# Patient Record
Sex: FEMALE | Race: BLACK OR AFRICAN AMERICAN | URBAN - METROPOLITAN AREA
[De-identification: names, ages, dates, MRNs, and addresses within clinical notes are randomized per-mention and may not be internally consistent; named-entity substitution may affect disease eponyms.]

---

## 2019-12-12 ENCOUNTER — EMERGENCY (EMERGENCY)
Facility: HOSPITAL | Age: 34
LOS: 1 days | Discharge: HOME | End: 2019-12-12
Attending: EMERGENCY MEDICINE | Admitting: EMERGENCY MEDICINE
Payer: COMMERCIAL

## 2019-12-12 VITALS
OXYGEN SATURATION: 99 % | HEART RATE: 82 BPM | DIASTOLIC BLOOD PRESSURE: 80 MMHG | SYSTOLIC BLOOD PRESSURE: 135 MMHG | RESPIRATION RATE: 15 BRPM

## 2019-12-12 VITALS
RESPIRATION RATE: 14 BRPM | SYSTOLIC BLOOD PRESSURE: 147 MMHG | HEART RATE: 78 BPM | HEIGHT: 69 IN | DIASTOLIC BLOOD PRESSURE: 96 MMHG | TEMPERATURE: 98 F | WEIGHT: 276.02 LBS | OXYGEN SATURATION: 100 %

## 2019-12-12 LAB
ALBUMIN SERPL ELPH-MCNC: 3.4 G/DL — SIGNIFICANT CHANGE UP (ref 3.3–5)
ALP SERPL-CCNC: 86 U/L — SIGNIFICANT CHANGE UP (ref 40–120)
ALT FLD-CCNC: 13 U/L — SIGNIFICANT CHANGE UP (ref 12–78)
ANION GAP SERPL CALC-SCNC: 7 MMOL/L — SIGNIFICANT CHANGE UP (ref 5–17)
AST SERPL-CCNC: 10 U/L — LOW (ref 15–37)
BILIRUB SERPL-MCNC: 0.2 MG/DL — SIGNIFICANT CHANGE UP (ref 0.2–1.2)
BUN SERPL-MCNC: 7 MG/DL — SIGNIFICANT CHANGE UP (ref 7–23)
CALCIUM SERPL-MCNC: 8.8 MG/DL — SIGNIFICANT CHANGE UP (ref 8.5–10.1)
CHLORIDE SERPL-SCNC: 105 MMOL/L — SIGNIFICANT CHANGE UP (ref 96–108)
CK SERPL-CCNC: 79 U/L — SIGNIFICANT CHANGE UP (ref 26–192)
CO2 SERPL-SCNC: 27 MMOL/L — SIGNIFICANT CHANGE UP (ref 22–31)
CREAT SERPL-MCNC: 0.58 MG/DL — SIGNIFICANT CHANGE UP (ref 0.5–1.3)
D DIMER BLD IA.RAPID-MCNC: <150 NG/ML DDU — SIGNIFICANT CHANGE UP
GLUCOSE SERPL-MCNC: 129 MG/DL — HIGH (ref 70–99)
HCT VFR BLD CALC: 31.6 % — LOW (ref 34.5–45)
HGB BLD-MCNC: 9.7 G/DL — LOW (ref 11.5–15.5)
MCHC RBC-ENTMCNC: 21.9 PG — LOW (ref 27–34)
MCHC RBC-ENTMCNC: 30.7 GM/DL — LOW (ref 32–36)
MCV RBC AUTO: 71.3 FL — LOW (ref 80–100)
NRBC # BLD: 0 /100 WBCS — SIGNIFICANT CHANGE UP (ref 0–0)
PLATELET # BLD AUTO: 326 K/UL — SIGNIFICANT CHANGE UP (ref 150–400)
POTASSIUM SERPL-MCNC: 3.8 MMOL/L — SIGNIFICANT CHANGE UP (ref 3.5–5.3)
POTASSIUM SERPL-SCNC: 3.8 MMOL/L — SIGNIFICANT CHANGE UP (ref 3.5–5.3)
PROT SERPL-MCNC: 8.1 G/DL — SIGNIFICANT CHANGE UP (ref 6–8.3)
RBC # BLD: 4.43 M/UL — SIGNIFICANT CHANGE UP (ref 3.8–5.2)
RBC # FLD: 16.6 % — HIGH (ref 10.3–14.5)
SODIUM SERPL-SCNC: 139 MMOL/L — SIGNIFICANT CHANGE UP (ref 135–145)
TROPONIN I SERPL-MCNC: <.015 NG/ML — SIGNIFICANT CHANGE UP (ref 0.01–0.04)
WBC # BLD: 7.57 K/UL — SIGNIFICANT CHANGE UP (ref 3.8–10.5)
WBC # FLD AUTO: 7.57 K/UL — SIGNIFICANT CHANGE UP (ref 3.8–10.5)

## 2019-12-12 PROCEDURE — 82550 ASSAY OF CK (CPK): CPT

## 2019-12-12 PROCEDURE — 99285 EMERGENCY DEPT VISIT HI MDM: CPT

## 2019-12-12 PROCEDURE — 85027 COMPLETE CBC AUTOMATED: CPT

## 2019-12-12 PROCEDURE — 71046 X-RAY EXAM CHEST 2 VIEWS: CPT | Mod: 26

## 2019-12-12 PROCEDURE — 84484 ASSAY OF TROPONIN QUANT: CPT

## 2019-12-12 PROCEDURE — 36415 COLL VENOUS BLD VENIPUNCTURE: CPT

## 2019-12-12 PROCEDURE — 93010 ELECTROCARDIOGRAM REPORT: CPT

## 2019-12-12 PROCEDURE — 71046 X-RAY EXAM CHEST 2 VIEWS: CPT

## 2019-12-12 PROCEDURE — 96360 HYDRATION IV INFUSION INIT: CPT

## 2019-12-12 PROCEDURE — 93005 ELECTROCARDIOGRAM TRACING: CPT

## 2019-12-12 PROCEDURE — 99284 EMERGENCY DEPT VISIT MOD MDM: CPT | Mod: 25

## 2019-12-12 PROCEDURE — 80053 COMPREHEN METABOLIC PANEL: CPT

## 2019-12-12 PROCEDURE — 85379 FIBRIN DEGRADATION QUANT: CPT

## 2019-12-12 PROCEDURE — 99284 EMERGENCY DEPT VISIT MOD MDM: CPT

## 2019-12-12 RX ORDER — SODIUM CHLORIDE 9 MG/ML
1000 INJECTION INTRAMUSCULAR; INTRAVENOUS; SUBCUTANEOUS ONCE
Refills: 0 | Status: COMPLETED | OUTPATIENT
Start: 2019-12-12 | End: 2019-12-12

## 2019-12-12 RX ADMIN — SODIUM CHLORIDE 1000 MILLILITER(S): 9 INJECTION INTRAMUSCULAR; INTRAVENOUS; SUBCUTANEOUS at 15:01

## 2019-12-12 RX ADMIN — SODIUM CHLORIDE 1000 MILLILITER(S): 9 INJECTION INTRAMUSCULAR; INTRAVENOUS; SUBCUTANEOUS at 14:01

## 2019-12-12 NOTE — ED PROVIDER NOTE - NSFOLLOWUPINSTRUCTIONS_ED_ALL_ED_FT
1)  Follow-up with your Primary Medical Doctor, Dr ogden. Call today / next business day for prompt follow-up.  2) Follow-up with Cardiology as discussed. Call today / next business day for prompt follow-up and further workup and evaluation.  3) Return to Emergency room for any worsening or persistent pain, shortness of breath, weakness, fever, abdominal pain, dizziness, passing out, unexplained pain in arms, legs, back, any vomiting, feeling like your heart is racing,  or any other concerning symptoms.  4) See attached instruction sheets for additional information, including information regarding signs and symptoms to look out for, reasons to seek immediate care and other important instructions.

## 2019-12-12 NOTE — CONSULT NOTE ADULT - SUBJECTIVE AND OBJECTIVE BOX
[facility]         Angeles Ramirez, Flori, Renée, Jase, Ronald, Tamiko        [phone] (office)    CHIEF COMPLAINT: Patient is a 33y old  Female who presents with a chief complaint of chest pain    HPI: 32yo F with PMH of DM type 2 not on insulin, HTN, Anemia, Labrum tear s/p surgical repair, herniated cervical discs presents to the ED c/o chest pain. Pt states she woke up this morning with severe neck pain 8/10 in intensity after which she started experiencing chest pain. CP 8/10, sharp radiating to back and neck. Pt also states she has numbness/tingling down the left arm which she has had in the past related to her neck.  Admits to dizziness and headache associated with this episode as well. Symptoms started at 9-10am and patient tried to lie down and ate breakfast however symptoms did not resolve which prompted her to come to ED.  Of note, since this past saturday pt has had elevated BPs noted by her /115.     PAST MEDICAL & SURGICAL HISTORY:  DM (diabetes mellitus)  HTN (hypertension)      SOCIAL HISTORY: No active tobacco, alcohol or illicit drug use    FAMILY HISTORY:  Mother Liver cirrhosis  Father Lung Ca    Outpatient medications:  Metformin  Lisinopril  Tramadol  Ferrous sulfate            MEDICATIONS  (STANDING):    MEDICATIONS  (PRN):      Allergies    No Known Allergies    Intolerances        REVIEW OF SYSTEMS: Is negative for eye, ENT, GI, , allergic, dermatologic, musculoskeletal and neurologic, except as described above.    VITAL SIGNS:   Vital Signs Last 24 Hrs  T(C): 36.9 (12 Dec 2019 13:04), Max: 36.9 (12 Dec 2019 13:04)  T(F): 98.5 (12 Dec 2019 13:04), Max: 98.5 (12 Dec 2019 13:04)  HR: 78 (12 Dec 2019 13:04) (78 - 78)  BP: 147/96 (12 Dec 2019 13:04) (147/96 - 147/96)  BP(mean): --  RR: 14 (12 Dec 2019 13:04) (14 - 14)  SpO2: 100% (12 Dec 2019 13:04) (100% - 100%)    I&O's Summary      PHYSICAL EXAM:    Constitutional: NAD, awake and alert, well-developed  Eyes:  EOMI, no oral cyanosis, conjunctivae clear, anicteric.  Pulmonary: Non-labored, breath sounds are clear bilaterally, no wheezing, rales or rhonchi  Cardiovascular:  regular S1 and S2. No murmur.  No rubs, gallops or clicks  Gastrointestinal: Bowel Sounds present, soft, nontender.   MSK: Reproducible L sided chest tenderness  Lymph: No peripheral edema.   Neurological: Alert, strength and sensitivity are grossly intact  Skin: No obvious lesions/rashes.   Psych:  Mood & affect appropriate .    LABS: All Labs Reviewed:                        9.7    7.57  )-----------( 326      ( 12 Dec 2019 13:54 )             31.6     12 Dec 2019 13:54    139    |  105    |  7      ----------------------------<  129    3.8     |  27     |  0.58     Ca    8.8        12 Dec 2019 13:54    TPro  8.1    /  Alb  3.4    /  TBili  0.2    /  DBili  x      /  AST  10     /  ALT  13     /  AlkPhos  86     12 Dec 2019 13:54      CARDIAC MARKERS ( 12 Dec 2019 13:54 )  <.015 ng/mL / x     / x     / x     / x          Blood Culture:         RADIOLOGY:  < from: Xray Chest 2 Views PA/Lat (12.12.19 @ 13:54) >  EXAM:  XR CHEST PA LAT 2V                            PROCEDURE DATE:  12/12/2019          INTERPRETATION:  CLINICAL STATEMENT: Chest pain.    TECHNIQUE: PA and lateral views of the chest.    COMPARISON: None available.    FINDINGS/  IMPRESSION:  Noconsolidation or infiltrate.  No pleural effusion.    Heart size within normal limits.                  CAROLYNN SNOW M.D., ATTENDING RADIOLOGIST  This document has been electronically signed. Dec 12 2019  2:01PM                < end of copied text >      EKG:  NSR    Impression/Plan:

## 2019-12-12 NOTE — CONSULT NOTE ADULT - ASSESSMENT
34yo F with PMH of DM type 2 not on insulin, HTN, Obesity, Anemia, Labrum tear s/p surgical repair, herniated cervical discs presents to the ED c/o chest pain    - Doubt ACS at this time given neg trop, symptoms likely MSK   - No clear evidence of acute ischemia, trops negative x 1, pt asymptomatic at this time  - BP is elevated and can contribute to some of her symptoms, she needs stricter BP control  - should have full cardiac work up outpatient including ECHO, stress test  - would have her f/u outpatient  - No evidence of volume overload at this time  - monitor and replete lytes, keep K>4, Mg>2 34yo F with PMH of DM type 2 not on insulin, HTN, Obesity, Anemia, Labrum tear s/p surgical repair, herniated cervical discs presents to the ED c/o chest pain    - Doubt ACS at this time given neg trop, with symptoms likely MSK and clear reproducibility of her sxs on palpation  - No clear evidence of acute ischemia, trops negative x 1, pt asymptomatic at this time  - BP is elevated and can contribute to some of her symptoms, she needs stricter BP control. Given her pain, this may not be the right time to assess for whether her bp control is adequate.  will defer for now  - should have full cardiac work up outpatient including ECHO, stress test, though we can discuss this in the office in time  - would have her f/u outpatient  - No evidence of volume overload at this time  - monitor and replete lytes, keep K>4, Mg>2

## 2019-12-12 NOTE — ED PROVIDER NOTE - PROGRESS NOTE DETAILS
pt seen by tammie Lowery to [facility], for outpt fu  At length discussion with patient regarding nature of the chest pain. Discussed results of all diagnostic testing in ED. Discussed chest pain precautions and instructions. Patient demonstrates understanding that a cardiac cause of the chest pain has not been totally excluded, but at this time there is no evidence to warrant an inpatient workup.  Discussed the importance of continued follow-up with Cardiology as outpatient to complete cardiac workup.

## 2019-12-12 NOTE — ED ADULT NURSE NOTE - OBJECTIVE STATEMENT
Patient stable No acute distress noted. Cardiac results - MD found pain to be muscular dc'd. MSpencer

## 2019-12-12 NOTE — ED PROVIDER NOTE - OBJECTIVE STATEMENT
32 yo F with newly diagnosed "borderline DM" and HTN pw episode of chest heaviness today at rest. Lasted few min and then resoled. pt then had 2nd episode, which lasted ~ 2 hrs so came to ed. pt reportedly had short duration similar episode last week. no other cp/sob/palp. no fever/chills. no cough/congestion. No recent travel / immob. no agg/allev factors. pt states saw PMD this past weekend and was very hypertensive - was treated with IM medication in office. No other acute co. Pt with some dizziness today as well, earlier today.

## 2019-12-12 NOTE — ED PROVIDER NOTE - PATIENT PORTAL LINK FT
You can access the FollowMyHealth Patient Portal offered by [facility] by registering at the following website: [email or web address]. By joining [employer]’s FollowMyHealth portal, you will also be able to view your health information using other applications (apps) compatible with our system.

## 2019-12-12 NOTE — ED PROVIDER NOTE - CARE PROVIDER_API CALL
Ankit Chinchilla)  Cardiovascular Disease  [address] [place] [address]  Phone: [phone]  Fax: [phone]  Follow Up Time:

## 2020-01-09 ENCOUNTER — APPOINTMENT (OUTPATIENT)
Dept: CARDIOLOGY | Facility: CLINIC | Age: 35
End: 2020-01-09
Payer: COMMERCIAL

## 2020-01-09 ENCOUNTER — NON-APPOINTMENT (OUTPATIENT)
Age: 35
End: 2020-01-09

## 2020-01-09 VITALS
OXYGEN SATURATION: 97 % | HEART RATE: 74 BPM | DIASTOLIC BLOOD PRESSURE: 91 MMHG | SYSTOLIC BLOOD PRESSURE: 141 MMHG | WEIGHT: 270 LBS

## 2020-01-09 VITALS — BODY MASS INDEX: 39.87 KG/M2 | HEIGHT: 69 IN

## 2020-01-09 DIAGNOSIS — Z86.79 PERSONAL HISTORY OF OTHER DISEASES OF THE CIRCULATORY SYSTEM: ICD-10-CM

## 2020-01-09 PROBLEM — I10 ESSENTIAL (PRIMARY) HYPERTENSION: Chronic | Status: ACTIVE | Noted: 2019-12-12

## 2020-01-09 PROCEDURE — 93000 ELECTROCARDIOGRAM COMPLETE: CPT

## 2020-01-09 PROCEDURE — 99215 OFFICE O/P EST HI 40 MIN: CPT

## 2020-01-09 NOTE — HISTORY OF PRESENT ILLNESS
[FreeTextEntry1] : Yenni is a 34 year old female here for evaluation.\par She recently went to the emergency room with an episode of chest pain. \par Workup, including blood work and EKG were unremarkable. Her blood pressure was mildly elevated.\par \par She has a significant history of orthopedic issues/herniated discs, after a fall in 2018. She has been walking with a cane since. She also has a history of diabetes and hypertension. She is currently on lisinopril and metformin.\par \par She has no family history of coronary artery disease. She denies toxic habits. Two days prior to her chest pain, she was started on Ozempic. She reported a mid sternal chest pain, associated with left arm numbness, that did not resolve. It was also associated with some dizziness. In the past, her elevated blood pressures have been associated with dizziness.\par \par She has not had any chest pain since. She does report occasional dyspnea on rest. Her exercise tolerance is limited by her orthopedic issues.\par She takes tramadol and gabapentin for her back pain, with ibuprofen.

## 2020-01-09 NOTE — DISCUSSION/SUMMARY
[With Me] : with me [FreeTextEntry1] : Yenni has a history of hypertension, and had an episode of chest pain last week. The pain is atypical in description, though was associated with some left arm symptoms. It seems that her exercise tolerance is more limited by her orthopedic issues, then by her respiratory and cardiac status.\par \par Her EKG today demonstrates a sinus rhythm without obvious ischemia or chamber enlargement. Her blood pressure is elevated, though did she did not take her lisinopril this morning. Her physical exam is unremarkable.\par \par She will start checking her blood pressure at home, and we will see the trend. For now, she will continue lisinopril 10 mg daily. She will have an echocardiogram to evaluate for structural heart disease. If her symptoms return, we will set her up for a pharmacologic stress test. I stressed the importance of diet, exercise, and weight loss, to reduce her overall cardiovascular risk. I will call her with results of the echocardiogram, and arrange followup.

## 2020-01-09 NOTE — PHYSICAL EXAM
[General Appearance - Well Developed] : well developed [Well Groomed] : well groomed [Normal Appearance] : normal appearance [General Appearance - Well Nourished] : well nourished [No Deformities] : no deformities [Normal Conjunctiva] : the conjunctiva exhibited no abnormalities [General Appearance - In No Acute Distress] : no acute distress [Eyelids - No Xanthelasma] : the eyelids demonstrated no xanthelasmas [Normal Oral Mucosa] : normal oral mucosa [No Oral Pallor] : no oral pallor [No Jugular Venous Colon A Waves] : no jugular venous colon A waves [No Oral Cyanosis] : no oral cyanosis [Normal Jugular Venous V Waves Present] : normal jugular venous V waves present [Normal Jugular Venous A Waves Present] : normal jugular venous A waves present [Respiration, Rhythm And Depth] : normal respiratory rhythm and effort [Auscultation Breath Sounds / Voice Sounds] : lungs were clear to auscultation bilaterally [Exaggerated Use Of Accessory Muscles For Inspiration] : no accessory muscle use [Normal S1] : normal S1 [Normal S2] : normal S2 [Normal Rate] : normal [S4] : no S4 [S3] : no S3 [No Murmur] : no murmurs heard [Right Carotid Bruit] : no bruit heard over the right carotid [Left Carotid Bruit] : no bruit heard over the left carotid [Left Femoral Bruit] : no bruit heard over the left femoral artery [Right Femoral Bruit] : no bruit heard over the right femoral artery [2+] : right 2+ [No Pitting Edema] : no pitting edema present [No Abnormalities] : the abdominal aorta was not enlarged and no bruit was heard [Abdomen Tenderness] : non-tender [Abdomen Soft] : soft [Abdomen Mass (___ Cm)] : no abdominal mass palpated [FreeTextEntry1] : with cane [Nail Clubbing] : no clubbing of the fingernails [Cyanosis, Localized] : no localized cyanosis [Petechial Hemorrhages (___cm)] : no petechial hemorrhages [Skin Color & Pigmentation] : normal skin color and pigmentation [] : no rash [Skin Lesions] : no skin lesions [No Venous Stasis] : no venous stasis [No Skin Ulcers] : no skin ulcer [No Xanthoma] : no  xanthoma was observed [Mood] : the mood was normal [Oriented To Time, Place, And Person] : oriented to person, place, and time [Affect] : the affect was normal [No Anxiety] : not feeling anxious

## 2020-01-09 NOTE — REVIEW OF SYSTEMS
[Feeling Fatigued] : feeling fatigued [Shortness Of Breath] : shortness of breath [Chest Pain] : chest pain [Joint Pain] : joint pain [Joint Stiffness] : joint stiffness [Limb Weakness (Paresis)] : limb weakness [Negative] : Endocrine

## 2020-01-13 ENCOUNTER — APPOINTMENT (OUTPATIENT)
Dept: CARDIOLOGY | Facility: CLINIC | Age: 35
End: 2020-01-13
Payer: COMMERCIAL

## 2020-01-13 PROCEDURE — 93306 TTE W/DOPPLER COMPLETE: CPT

## 2020-03-05 ENCOUNTER — APPOINTMENT (OUTPATIENT)
Dept: CARDIOLOGY | Facility: CLINIC | Age: 35
End: 2020-03-05

## 2020-07-29 ENCOUNTER — NON-APPOINTMENT (OUTPATIENT)
Age: 35
End: 2020-07-29

## 2020-07-29 ENCOUNTER — APPOINTMENT (OUTPATIENT)
Dept: CARDIOLOGY | Facility: CLINIC | Age: 35
End: 2020-07-29
Payer: COMMERCIAL

## 2020-07-29 VITALS
DIASTOLIC BLOOD PRESSURE: 90 MMHG | HEART RATE: 69 BPM | OXYGEN SATURATION: 99 % | BODY MASS INDEX: 39.25 KG/M2 | SYSTOLIC BLOOD PRESSURE: 142 MMHG | HEIGHT: 69 IN | WEIGHT: 265 LBS

## 2020-07-29 PROCEDURE — 93000 ELECTROCARDIOGRAM COMPLETE: CPT

## 2020-07-29 PROCEDURE — 99214 OFFICE O/P EST MOD 30 MIN: CPT

## 2020-07-29 NOTE — PHYSICAL EXAM
[General Appearance - Well Developed] : well developed [Normal Appearance] : normal appearance [Well Groomed] : well groomed [General Appearance - Well Nourished] : well nourished [No Deformities] : no deformities [General Appearance - In No Acute Distress] : no acute distress [Normal Conjunctiva] : the conjunctiva exhibited no abnormalities [Eyelids - No Xanthelasma] : the eyelids demonstrated no xanthelasmas [No Oral Pallor] : no oral pallor [Normal Oral Mucosa] : normal oral mucosa [No Oral Cyanosis] : no oral cyanosis [Normal Jugular Venous V Waves Present] : normal jugular venous V waves present [Normal Jugular Venous A Waves Present] : normal jugular venous A waves present [No Jugular Venous Colon A Waves] : no jugular venous colon A waves [Respiration, Rhythm And Depth] : normal respiratory rhythm and effort [Exaggerated Use Of Accessory Muscles For Inspiration] : no accessory muscle use [Auscultation Breath Sounds / Voice Sounds] : lungs were clear to auscultation bilaterally [Abdomen Soft] : soft [Abdomen Tenderness] : non-tender [FreeTextEntry1] : with cane [Nail Clubbing] : no clubbing of the fingernails [Abdomen Mass (___ Cm)] : no abdominal mass palpated [Cyanosis, Localized] : no localized cyanosis [Petechial Hemorrhages (___cm)] : no petechial hemorrhages [] : no rash [Skin Color & Pigmentation] : normal skin color and pigmentation [No Venous Stasis] : no venous stasis [Skin Lesions] : no skin lesions [No Xanthoma] : no  xanthoma was observed [No Skin Ulcers] : no skin ulcer [Oriented To Time, Place, And Person] : oriented to person, place, and time [Affect] : the affect was normal [Mood] : the mood was normal [Normal Rate] : normal [No Anxiety] : not feeling anxious [Normal S1] : normal S1 [Normal S2] : normal S2 [S4] : no S4 [S3] : no S3 [No Murmur] : no murmurs heard [Right Carotid Bruit] : no bruit heard over the right carotid [Left Carotid Bruit] : no bruit heard over the left carotid [Right Femoral Bruit] : no bruit heard over the right femoral artery [Left Femoral Bruit] : no bruit heard over the left femoral artery [2+] : left 2+ [No Abnormalities] : the abdominal aorta was not enlarged and no bruit was heard [No Pitting Edema] : no pitting edema present

## 2020-07-29 NOTE — DISCUSSION/SUMMARY
[With Me] : with me [___ Month(s)] : [unfilled] month(s) [FreeTextEntry1] : Yenni has a history of hypertension, and the numbers have been running a little higher as of late.  She has pain overall, and has been taking NSAIDs, though has been substituting them with tylenol. It seems that her exercise tolerance is more limited by her orthopedic issues, then by her respiratory and cardiac status.\par \par Her EKG today demonstrates a sinus rhythm without obvious ischemia or chamber enlargement. Her blood pressure is elevated, though did she did not take her lisinopril this morning. Her physical exam is unremarkable.\par \par For her BP, I have increased her lisinopril to 20 mg daily. Her recent potassium and creatinine from last month were normal. I stressed the importance of diet, exercise, and weight loss, to reduce her overall cardiovascular risk. We reviewed low impact exercises like yoga, calisthenics and swimming. I will see her again in 3 months time.

## 2020-07-29 NOTE — HISTORY OF PRESENT ILLNESS
[FreeTextEntry1] : Yenni is a 34 year old female here for follow up.\par I last saw her in 1/2020. Prior to this, she went to the emergency room with an episode of chest pain. \par Workup, including blood work and EKG were unremarkable. Her blood pressure was mildly elevated.\par \par She has a significant history of orthopedic issues/herniated discs, after a fall in 2018. She has been walking with a cane since. She also has a history of diabetes and hypertension. She is currently on lisinopril and metformin. She has no family history of coronary artery disease. She denies toxic habits. \par \par She is feeling well. She has no chest pain. Her exercise tolerance is limited by her orthopedic issues. Her Bp has been running higher at home. She reports very mild edema at the end of the day.\par Echocardiogram with normal LV function, mild concentric LV remodeling.\par She takes tramadol and gabapentin for her back pain, with ibuprofen.

## 2020-07-29 NOTE — REVIEW OF SYSTEMS
[Feeling Fatigued] : feeling fatigued [Joint Stiffness] : joint stiffness [Limb Weakness (Paresis)] : limb weakness [Joint Pain] : joint pain [Negative] : Endocrine

## 2020-10-27 ENCOUNTER — APPOINTMENT (OUTPATIENT)
Dept: CARDIOLOGY | Facility: CLINIC | Age: 35
End: 2020-10-27

## 2021-01-02 ENCOUNTER — EMERGENCY (EMERGENCY)
Facility: HOSPITAL | Age: 36
LOS: 1 days | Discharge: HOME | End: 2021-01-02
Attending: STUDENT IN AN ORGANIZED HEALTH CARE EDUCATION/TRAINING PROGRAM | Admitting: STUDENT IN AN ORGANIZED HEALTH CARE EDUCATION/TRAINING PROGRAM
Payer: COMMERCIAL

## 2021-01-02 VITALS
DIASTOLIC BLOOD PRESSURE: 95 MMHG | HEIGHT: 69 IN | HEART RATE: 85 BPM | RESPIRATION RATE: 18 BRPM | WEIGHT: 293 LBS | TEMPERATURE: 98 F | SYSTOLIC BLOOD PRESSURE: 150 MMHG | OXYGEN SATURATION: 100 %

## 2021-01-02 PROCEDURE — 96374 THER/PROPH/DIAG INJ IV PUSH: CPT

## 2021-01-02 PROCEDURE — 99283 EMERGENCY DEPT VISIT LOW MDM: CPT

## 2021-01-02 PROCEDURE — 72100 X-RAY EXAM L-S SPINE 2/3 VWS: CPT | Mod: 26

## 2021-01-02 PROCEDURE — 99284 EMERGENCY DEPT VISIT MOD MDM: CPT | Mod: 25

## 2021-01-02 PROCEDURE — 72100 X-RAY EXAM L-S SPINE 2/3 VWS: CPT

## 2021-01-02 RX ORDER — DEXAMETHASONE 0.5 MG/5ML
10 ELIXIR ORAL ONCE
Refills: 0 | Status: DISCONTINUED | OUTPATIENT
Start: 2021-01-02 | End: 2021-01-02

## 2021-01-02 RX ORDER — DEXAMETHASONE 0.5 MG/5ML
10 ELIXIR ORAL ONCE
Refills: 0 | Status: COMPLETED | OUTPATIENT
Start: 2021-01-02 | End: 2021-01-02

## 2021-01-02 RX ORDER — LIDOCAINE 4 G/100G
1 CREAM TOPICAL ONCE
Refills: 0 | Status: COMPLETED | OUTPATIENT
Start: 2021-01-02 | End: 2021-01-02

## 2021-01-02 RX ADMIN — LIDOCAINE 1 PATCH: 4 CREAM TOPICAL at 14:00

## 2021-01-02 RX ADMIN — Medication 102 MILLIGRAM(S): at 14:00

## 2021-01-02 NOTE — ED PROVIDER NOTE - PATIENT PORTAL LINK FT
You can access the FollowMyHealth Patient Portal offered by [facility] by registering at the following website: [email or web address]. By joining [employer]’s FollowMyHealth portal, you will also be able to view your health information using other applications (apps) compatible with our system.

## 2021-01-02 NOTE — ED ADULT NURSE NOTE - NSIMPLEMENTINTERV_GEN_ALL_ED
Implemented All Fall Risk Interventions:  [place] to call system. Call bell, personal items and telephone within reach. Instruct patient to call for assistance. Room bathroom lighting operational. Non-slip footwear when patient is off stretcher. Physically safe environment: no spills, clutter or unnecessary equipment. Stretcher in lowest position, wheels locked, appropriate side rails in place. Provide visual cue, wrist band, yellow gown, etc. Monitor gait and stability. Monitor for mental status changes and reorient to person, place, and time. Review medications for side effects contributing to fall risk. Reinforce activity limits and safety measures with patient and family.

## 2021-01-02 NOTE — ED PROVIDER NOTE - OBJECTIVE STATEMENT
35 year old female with history of chronic back pain (with chronic weakness and numbness to left leg), DM, and HTN BIBA for worsening back pain with worsening 35 year old female with history of chronic back pain (with chronic weakness and numbness to left leg), DM, and HTN BIBA for worsening back pain with worsening weakness to left lower leg. also reports intermittent incontinence of urine, not stool. no numbness in groin. had 3rd steroid injection 5 days ago, reports no improvement. increased back discomfort since yesterday. today was walking and felt sudden increase in pain to back and "legs went numb".   reports work comp injury april 2nd 2018 from a slip and fall. had labrum tear to left hip with surgery and herniated disk of L4 and L5. has had chronic pain since the injury   PCP Christiano Vines   [facility]

## 2021-01-02 NOTE — ED PROVIDER NOTE - ATTENDING CONTRIBUTION TO CARE
I, Gm Whittaker DO, personally saw the patient with ACP.  I have personally performed a face to face diagnostic evaluation on this patient.  I have reviewed the ACP note and agree with the history, exam, and plan of care, except as noted.    35 F with chronic back pain treated with injections here complaining of worsening back pain and left leg weakness. Also with reported intermittent bladder incontinence. Pain increased suddenly when walking. On exam, patient comfortable, lungs clear to auscultation anteriorly, heart regular rate and rhythm, left leg with weakness to hip flexion / knee extension. Concern for worsening back pain including acute spinal cord pathology. will get ortho spine on board.

## 2021-01-02 NOTE — ED ADULT TRIAGE NOTE - CHIEF COMPLAINT QUOTE
Patient c.o chronic back pain and left leg numbness Patient c.o chronic back pain and left leg numbness. Patient states she had a cortisone shot on Monday with no relief. Patient states she's been becoming incontinent at times since yesterday,

## 2021-01-02 NOTE — ED PROVIDER NOTE - CONSTITUTIONAL, MLM
normal... Well appearing, awake, alert, oriented to person, place, time/situation and in no apparent distress. laughing and joking

## 2021-01-02 NOTE — ED PROVIDER NOTE - NSFOLLOWUPINSTRUCTIONS_ED_ALL_ED_FT
ice or moist heat  gentle stretching, gentle massage  medrol dose pack, start tomorrow  follow up with Dr. Pang Monday  MRI Monday at 2 pm as scheduled       Acute Low Back Pain    WHAT YOU NEED TO KNOW:    Acute low back pain is sudden discomfort in your lower back area that lasts for up to 6 weeks. The discomfort makes it difficult to tolerate activity.     DISCHARGE INSTRUCTIONS:    Return to the emergency department if:   •You have severe pain.      •You have sudden stiffness and heaviness on both buttocks down to both legs.      •You have numbness or weakness in one leg, or pain in both legs.      •You have numbness in your genital area or across your lower back.      •You cannot control your urine or bowel movements.      Contact your healthcare provider if:   •You have a fever.      •You have pain at night or when you rest.      •Your pain does not get better with treatment.      •You have pain that worsens when you cough or sneeze.      •You suddenly feel something pop or snap in your back.      •You have questions or concerns about your condition or care.      Medicines: You may need any of the following:   •NSAIDs help decrease swelling and pain. This medicine is available with or without a doctor's order. NSAIDs can cause stomach bleeding or kidney problems in certain people. If you take blood thinner medicine, always ask your healthcare provider if NSAIDs are safe for you. Always read the medicine label and follow directions.      •Acetaminophen decreases pain and fever. It is available without a doctor's order. Ask how much to take and how often to take it. Follow directions. Read the labels of all other medicines you are using to see if they also contain acetaminophen, or ask your doctor or pharmacist. Acetaminophen can cause liver damage if not taken correctly. Do not use more than 4 grams (4,000 milligrams) total of acetaminophen in one day.       •Muscle relaxers decrease pain by relaxing the muscles in your lower spine.      •Prescription pain medicine may be given. Ask your healthcare provider how to take this medicine safely. Some prescription pain medicines contain acetaminophen. Do not take other medicines that contain acetaminophen without talking to your healthcare provider. Too much acetaminophen may cause liver damage. Prescription pain medicine may cause constipation. Ask your healthcare provider how to prevent or treat constipation.       Self-care:   •Stay active as much as you can without causing more pain. Bed rest could make your back pain worse. Start with some light exercises, such as walking. Avoid heavy lifting until your pain is gone. Ask for more information about the activities or exercises that are right for you.       •Apply ice on your back for 15 to 20 minutes every hour or as directed. Use an ice pack, or put crushed ice in a plastic bag. Cover it with a towel before you apply it to your skin. Ice helps prevent tissue damage and decreases swelling and pain.       •Apply heat on your back for 20 to 30 minutes every 2 hours for as many days as directed. Heat helps decrease pain and muscle spasms. Alternate heat and ice.      Prevent acute low back pain:   •Use proper body mechanics. ?Bend at the hips and knees when you  objects. Do not bend from the waist. Use your leg muscles as you lift the load. Do not use your back. Keep the object close to your chest as you lift it. Try not to twist or lift anything above your waist.      ?Change your position often when you stand for long periods of time. Rest one foot on a small box or footrest, and then switch to the other foot often.      ?Try not to sit for long periods of time. When you do, sit in a straight-backed chair with your feet flat on the floor. Never reach, pull, or push while you are sitting.      •Do exercises that strengthen your back muscles. Warm up before you exercise. Ask your healthcare provider the best exercises for you.      •Maintain a healthy weight. Ask your healthcare provider how much you should weigh. Ask him or her to help you create a weight loss plan if you are overweight.      Follow up with your healthcare provider as directed: Return for a follow-up visit if you still have pain after 1 to 3 weeks of treatment. You may need to visit an orthopedist if your back pain lasts longer than 12 weeks. Write down your questions so you remember to ask them during your visits.

## 2021-01-02 NOTE — ED PROVIDER NOTE - CLINICAL SUMMARY MEDICAL DECISION MAKING FREE TEXT BOX
worsening low back pain (chronic back pain) with weakness to ext and on and off incontinence. consider cauda equina syndrome. +rectal tone. x-ray lumbar spine, ortho consult

## 2021-01-02 NOTE — ED ADULT NURSE NOTE - CAS DISCH TRANSFER METHOD
The diagnosis of accelerated HTN has been documented for this patient. In ICD-10, accelerated HTN is an unspecified term. Based on your medical judgement, could your documentation be further specified as:     =>Hypertensive urgency  =>Hypertensive crisis  =>Hypertensive emergency  =>Other Explanation of clinical findings  =>Unable to Determine (no explanation of clinical findings)     The medical record reflects the following clinical findings, treatment, and risk factors:     66 yo female PMH HTN, PAF presenting with SOB; noncompliant with Coreg and Diltiazem. /117. Given Hydralazine 20 mg IV x 1, Labetalol 20 mg IV x 1, Norvasc 5 mg po, and resumed home meds: Clonidine 0.1 mg TID, Diltiazem 30 mg TID, Tambocor 50 mg BID, Zestril 10 mg daily. Please clarify and document your clinical opinion in the progress notes and discharge summary including the definitive and/or presumptive diagnosis, (suspected or probable), related to the above clinical findings. Please include clinical findings supporting your diagnosis.      REFERENCE:  -----------------------------------------------  Hypertensive Urgency: SBP > 180 or DBP > 120 in the absence of progressive target organ dysfunction     Hypertensive Crisis: BP elevates rapidly and severely enough to potentially cause organ damage (e.g. severe HA, SOB, nosebleed, severe anxiety, nausea/vomiting, etc.)    Hypertensive Emergency: SBP >180 or DBP > 120 with associated organ dysfunction (e.g. CVA, LOC, memory loss, MI, CONI, aortic dissection, angina, pulmonary edema, encephalopathy, retinopathy, HELLP, etc.)    Wallace Alcala, [facility], [address] [place] [address]  [email or web address] Private car

## 2021-01-02 NOTE — ED PROVIDER NOTE - CARE PROVIDER_API CALL
Yair Pang  ORTHOPAEDIC SURGERY  [address] [facility], [address] [place] [address]  Phone: [phone]  Fax: [phone]  Follow Up Time: 1-3 Days

## 2021-01-02 NOTE — ED ADULT NURSE NOTE - CHIEF COMPLAINT QUOTE
Patient c.o chronic back pain and left leg numbness. Patient states she had a cortisone shot on Monday with no relief. Patient states she's been becoming incontinent at times since yesterday,

## 2021-01-02 NOTE — ED PROVIDER NOTE - NEUROLOGICAL, MLM
Alert and oriented, no focal deficits. decreased sensation to left leg when compared to right (chronic per patient). diffuse weakness to left leg when compared to right (chronic per patient). no saddle anesthesia. +rectal tone

## 2021-01-02 NOTE — ED PROVIDER NOTE - PROGRESS NOTE DETAILS
was seen and evaluated by ortho. does not suspect cauda equina syndrome. suspects incontinence on and off due to worsening pain. +rectal tone. has MRI scheduled outpatient in 2 days. will see Dr. Pang in 2 days. overall pain much improved after decadron. ambulatory with use of cane (baseline per patient). feels comfortable and requesting to go home. return precautions explained.   Discussed the results of all diagnostic testing in ED and copies of all reports given.   An opportunity to ask questions was given.  Discussed the importance of prompt, close medical follow-up.  Patient will return with any changes, concerns or persistent / worsening symptoms.  Understanding of all instructions verbalized.

## 2021-01-02 NOTE — ED PROVIDER NOTE - CARE PLAN
Principal Discharge DX:	Acute low back pain with sciatica, sciatica laterality unspecified, unspecified back pain laterality  Secondary Diagnosis:	Lumbar radiculopathy, acute

## 2021-01-02 NOTE — ED PROVIDER NOTE - PRINCIPAL DIAGNOSIS
Acute low back pain with sciatica, sciatica laterality unspecified, unspecified back pain laterality

## 2021-01-02 NOTE — CONSULT NOTE ADULT - SUBJECTIVE AND OBJECTIVE BOX
Patient is a 35y Female who presents c/o increasing pain in the lower back with radiation of symptoms to the left lower extremity and weakness for one weeks duration. Patient had a fall in 2018 which she has been following with a pain management and orthopedic spine surgeon for ongoing lower back pain and radicular symptoms. Patient has seen Dr Pang in the past for evaluation for the need for spinal surgery. At that time surgery was not indicated. Patient states she has been having episodes of urinary incontinence that have occurred since the increase in pain over the past week. Patient states she has full sensation when going to the bathroom and cleaning herself. Denies pain/injury elsewhere. Denies bowel incontinence. Denies fevers/chills. No other complaints at this time.      Allergies    No Known Allergies        PAST MEDICAL & SURGICAL HISTORY:  Chronic back pain    DM (diabetes mellitus)    HTN (hypertension)        Vital Signs Last 24 Hrs  T(C): 36.9 (01-02-21 @ 12:06), Max: 36.9 (01-02-21 @ 12:06)  T(F): 98.5 (01-02-21 @ 12:06), Max: 98.5 (01-02-21 @ 12:06)  HR: 85 (01-02-21 @ 12:06) (85 - 85)  BP: 150/95 (01-02-21 @ 12:06) (150/95 - 150/95)  BP(mean): --  RR: 18 (01-02-21 @ 12:06) (18 - 18)  SpO2: 100% (01-02-21 @ 12:06) (100% - 100%)    Gen: NAD    Spine PE:  Skin intact  No gross deformity  No midnline TTP C/T/L/S spine  No bony step offs  Paraspinal muscle ttp/hypertonicity in the lumbar region   Negative clonus  Negative babinski  Negative breaux  + rectal tone passive and active  No saddle anesthesia                 Bicep        Tricep      Wrist Ext     Finger Flex      Finger Abd  R            5/5          5/5           5/5              5/5                5/5	                           L             5/5           5/5          5/5              5/5                5/5                                                         Hip Flex       Quad     Ankle DF        Ankle PF       Toe Ext          R            5/5              5/5          5/5                 5/5                 5/5	                L            5/5              5/5           5/5                 5/5                 5/5                    Sensory:            C5         C6         C7      C8       T1        (0=absent, 1=impaired, 2=normal, NT=not testable)  R         2            2           2        2         2  L          2            2           2        2         2               L2          L3         L4      L5       S1         (0=absent, 1=impaired, 2=normal, NT=not testable)  R         2            2            2        2        2  L          1            2           2        2         2      Imaging: XR L Spine reveals no evidence of fracture or spondylolisthesis, disc height maintained      A/P: 35y Female with lumbar radiculopathy radiating to the left lower extremity, low concern for acute cauda equina syndrome    Recommend dose of decadron in the ED   Recommend Medrol Dose pack   Recommend Outpatient MRI of the lumbar spine  Pain control  WBAT with assistive devices as needed  -Orthopaedically stable for discharge  -Follow up w/ Dr. Pang on Monday 1/4/20 after discharge. Call office to schedule appointment.  -Discharge planning  -All patient's questions answered. Patient understands and agrees w/ above plan.  -Will discuss w/ attending and advise if plan changes.

## 2021-05-21 PROBLEM — M54.9 DORSALGIA, UNSPECIFIED: Chronic | Status: ACTIVE | Noted: 2021-01-02

## 2021-06-25 ENCOUNTER — NON-APPOINTMENT (OUTPATIENT)
Age: 36
End: 2021-06-25

## 2021-06-25 ENCOUNTER — APPOINTMENT (OUTPATIENT)
Dept: CARDIOLOGY | Facility: CLINIC | Age: 36
End: 2021-06-25
Payer: COMMERCIAL

## 2021-06-25 VITALS
OXYGEN SATURATION: 100 % | BODY MASS INDEX: 35.99 KG/M2 | DIASTOLIC BLOOD PRESSURE: 85 MMHG | WEIGHT: 243 LBS | HEIGHT: 69 IN | SYSTOLIC BLOOD PRESSURE: 122 MMHG | HEART RATE: 86 BPM

## 2021-06-25 PROCEDURE — 99072 ADDL SUPL MATRL&STAF TM PHE: CPT

## 2021-06-25 PROCEDURE — 93000 ELECTROCARDIOGRAM COMPLETE: CPT

## 2021-06-25 PROCEDURE — 99214 OFFICE O/P EST MOD 30 MIN: CPT

## 2021-06-25 NOTE — PHYSICAL EXAM
[Well Developed] : well developed [Well Nourished] : well nourished [No Acute Distress] : no acute distress [Normal Conjunctiva] : normal conjunctiva [Normal Venous Pressure] : normal venous pressure [No Carotid Bruit] : no carotid bruit [No Murmur] : no murmur [Normal S1, S2] : normal S1, S2 [No Rub] : no rub [No Gallop] : no gallop [Clear Lung Fields] : clear lung fields [Good Air Entry] : good air entry [No Respiratory Distress] : no respiratory distress  [Non Tender] : non-tender [Soft] : abdomen soft [No Masses/organomegaly] : no masses/organomegaly [Normal Bowel Sounds] : normal bowel sounds [Normal Gait] : normal gait [No Edema] : no edema [No Cyanosis] : no cyanosis [No Clubbing] : no clubbing [No Varicosities] : no varicosities [No Rash] : no rash [No Skin Lesions] : no skin lesions [Moves all extremities] : moves all extremities [No Focal Deficits] : no focal deficits [Normal Speech] : normal speech [Alert and Oriented] : alert and oriented [Normal memory] : normal memory

## 2021-06-25 NOTE — HISTORY OF PRESENT ILLNESS
[FreeTextEntry1] : Yenni is a 35 year old female here for follow up.\par I last saw her in 7/2020. Prior to this, she went to the emergency room with an episode of chest pain. \par Workup, including blood work and EKG were unremarkable. Her blood pressure was mildly elevated.\par \par She has a significant history of orthopedic issues/herniated discs, after a fall in 2018. She has been walking with a cane since. She also has a history of diabetes and hypertension. She is currently on lisinopril and metformin. She has no family history of coronary artery disease. She denies toxic habits. \par \par She is feeling well. She did have an episode of chest pain, radiating to her neck and back. Her exercise tolerance is limited by her orthopedic issues. Her Bp has been running much better. She reports very mild edema at the end of the day.\par Echocardiogram with normal LV function, mild concentric LV remodeling in 2020.\par She takes tramadol and gabapentin for her back pain, with ibuprofen.\par She did go to the ER earlier this year for hyperglycemia and leg weakness.

## 2021-06-25 NOTE — DISCUSSION/SUMMARY
[With Me] : with me [___ Month(s)] : in [unfilled] month(s) [FreeTextEntry1] : Yenni has a history of hypertension, and the numbers have been running much better.  It seems that her exercise tolerance is more limited by her orthopedic issues, then by her respiratory and cardiac status. She did have an episode of chest pain though quite atypical.\par \par Her EKG today demonstrates a sinus rhythm without obvious ischemia or chamber enlargement. Her blood pressure is well controlled.\par \par She will remain on lisinopril. We will repeat her echocardiogram, and if the pain continues, consider a pharmacologic stress test. I stressed the importance of diet, exercise, and weight loss, to reduce her overall cardiovascular risk. We reviewed low impact exercises like yoga, calisthenics and swimming. She is going to find a new PMD and have a full set of blood work, including lipid profile.  I will see her again in 3-6 months time.

## 2021-07-02 ENCOUNTER — APPOINTMENT (OUTPATIENT)
Dept: CARDIOLOGY | Facility: CLINIC | Age: 36
End: 2021-07-02
Payer: COMMERCIAL

## 2021-07-02 PROCEDURE — 99072 ADDL SUPL MATRL&STAF TM PHE: CPT

## 2021-07-02 PROCEDURE — 93306 TTE W/DOPPLER COMPLETE: CPT

## 2022-01-11 ENCOUNTER — APPOINTMENT (OUTPATIENT)
Dept: MATERNAL FETAL MEDICINE | Facility: CLINIC | Age: 37
End: 2022-01-11
Payer: COMMERCIAL

## 2022-01-11 ENCOUNTER — ASOB RESULT (OUTPATIENT)
Age: 37
End: 2022-01-11

## 2022-01-11 PROCEDURE — G0108 DIAB MANAGE TRN  PER INDIV: CPT | Mod: 95

## 2022-01-18 RX ORDER — ISOPROPYL ALCOHOL 0.7 ML/ML
SWAB TOPICAL
Qty: 2 | Refills: 1 | Status: ACTIVE | COMMUNITY
Start: 2022-01-11 | End: 1900-01-01

## 2022-01-19 ENCOUNTER — ASOB RESULT (OUTPATIENT)
Age: 37
End: 2022-01-19

## 2022-01-19 ENCOUNTER — APPOINTMENT (OUTPATIENT)
Dept: ANTEPARTUM | Facility: CLINIC | Age: 37
End: 2022-01-19
Payer: COMMERCIAL

## 2022-01-19 PROCEDURE — G0108 DIAB MANAGE TRN  PER INDIV: CPT | Mod: 95

## 2022-01-21 DIAGNOSIS — E03.9 HYPOTHYROIDISM, UNSPECIFIED: ICD-10-CM

## 2022-01-21 DIAGNOSIS — Z87.59 PERSONAL HISTORY OF OTHER COMPLICATIONS OF PREGNANCY, CHILDBIRTH AND THE PUERPERIUM: ICD-10-CM

## 2022-01-21 DIAGNOSIS — Z87.898 PERSONAL HISTORY OF OTHER SPECIFIED CONDITIONS: ICD-10-CM

## 2022-01-25 ENCOUNTER — ASOB RESULT (OUTPATIENT)
Age: 37
End: 2022-01-25

## 2022-01-25 ENCOUNTER — APPOINTMENT (OUTPATIENT)
Dept: MATERNAL FETAL MEDICINE | Facility: CLINIC | Age: 37
End: 2022-01-25
Payer: COMMERCIAL

## 2022-01-25 PROCEDURE — G0108 DIAB MANAGE TRN  PER INDIV: CPT | Mod: 95

## 2022-01-26 ENCOUNTER — NON-APPOINTMENT (OUTPATIENT)
Age: 37
End: 2022-01-26

## 2022-01-27 ENCOUNTER — NON-APPOINTMENT (OUTPATIENT)
Age: 37
End: 2022-01-27

## 2022-01-27 ENCOUNTER — APPOINTMENT (OUTPATIENT)
Dept: MATERNAL FETAL MEDICINE | Facility: CLINIC | Age: 37
End: 2022-01-27
Payer: COMMERCIAL

## 2022-01-27 ENCOUNTER — APPOINTMENT (OUTPATIENT)
Dept: OBGYN | Facility: CLINIC | Age: 37
End: 2022-01-27
Payer: COMMERCIAL

## 2022-01-27 ENCOUNTER — APPOINTMENT (OUTPATIENT)
Dept: ANTEPARTUM | Facility: CLINIC | Age: 37
End: 2022-01-27
Payer: COMMERCIAL

## 2022-01-27 ENCOUNTER — ASOB RESULT (OUTPATIENT)
Age: 37
End: 2022-01-27

## 2022-01-27 ENCOUNTER — LABORATORY RESULT (OUTPATIENT)
Age: 37
End: 2022-01-27

## 2022-01-27 VITALS
BODY MASS INDEX: 37.18 KG/M2 | DIASTOLIC BLOOD PRESSURE: 83 MMHG | SYSTOLIC BLOOD PRESSURE: 132 MMHG | HEIGHT: 69 IN | WEIGHT: 251 LBS

## 2022-01-27 DIAGNOSIS — S79.912A UNSPECIFIED INJURY OF LEFT HIP, INITIAL ENCOUNTER: ICD-10-CM

## 2022-01-27 DIAGNOSIS — Z80.1 FAMILY HISTORY OF MALIGNANT NEOPLASM OF TRACHEA, BRONCHUS AND LUNG: ICD-10-CM

## 2022-01-27 DIAGNOSIS — M51.26 OTHER INTERVERTEBRAL DISC DISPLACEMENT, LUMBAR REGION: ICD-10-CM

## 2022-01-27 DIAGNOSIS — Z87.898 PERSONAL HISTORY OF OTHER SPECIFIED CONDITIONS: ICD-10-CM

## 2022-01-27 DIAGNOSIS — R07.89 OTHER CHEST PAIN: ICD-10-CM

## 2022-01-27 LAB
ALBUMIN SERPL ELPH-MCNC: 4.2 G/DL
ALP BLD-CCNC: 71 U/L
ALT SERPL-CCNC: 10 U/L
ANION GAP SERPL CALC-SCNC: 15 MMOL/L
AST SERPL-CCNC: 11 U/L
BILIRUB SERPL-MCNC: <0.2 MG/DL
BUN SERPL-MCNC: 7 MG/DL
CALCIUM SERPL-MCNC: 9.6 MG/DL
CHLORIDE SERPL-SCNC: 100 MMOL/L
CO2 SERPL-SCNC: 20 MMOL/L
CREAT SERPL-MCNC: 0.39 MG/DL
ESTIMATED AVERAGE GLUCOSE: 243 MG/DL
FERRITIN SERPL-MCNC: 23 NG/ML
GLUCOSE BLDC GLUCOMTR-MCNC: 172
GLUCOSE SERPL-MCNC: 63 MG/DL
HBA1C MFR BLD HPLC: 10.1 %
POTASSIUM SERPL-SCNC: 3.6 MMOL/L
PROT SERPL-MCNC: 7.5 G/DL
SODIUM SERPL-SCNC: 135 MMOL/L
TSH SERPL-ACNC: 0.43 UIU/ML

## 2022-01-27 PROCEDURE — 0500F INITIAL PRENATAL CARE VISIT: CPT

## 2022-01-27 PROCEDURE — 76801 OB US < 14 WKS SINGLE FETUS: CPT

## 2022-01-27 PROCEDURE — 36416 COLLJ CAPILLARY BLOOD SPEC: CPT

## 2022-01-27 PROCEDURE — 99204 OFFICE O/P NEW MOD 45 MIN: CPT

## 2022-01-27 PROCEDURE — 76813 OB US NUCHAL MEAS 1 GEST: CPT | Mod: 59

## 2022-01-27 RX ORDER — GABAPENTIN 300 MG
300 TABLET ORAL
Refills: 0 | Status: COMPLETED | COMMUNITY
End: 2022-01-27

## 2022-01-27 RX ORDER — CHROMIUM 200 MCG
TABLET ORAL
Refills: 0 | Status: COMPLETED | COMMUNITY
End: 2022-01-27

## 2022-01-27 RX ORDER — PRENATAL NO.167/FOLIC ACID/DHA 0.4MG-25MG
0.4-25 TABLET,CHEWABLE ORAL
Refills: 0 | Status: ACTIVE | COMMUNITY

## 2022-01-27 RX ORDER — IBUPROFEN 800 MG/1
800 TABLET, FILM COATED ORAL
Refills: 0 | Status: COMPLETED | COMMUNITY
End: 2022-01-27

## 2022-01-27 RX ORDER — LISINOPRIL 10 MG/1
10 TABLET ORAL DAILY
Qty: 90 | Refills: 3 | Status: COMPLETED | COMMUNITY
End: 2022-01-27

## 2022-01-27 RX ORDER — TRAMADOL HYDROCHLORIDE 50 MG/1
50 TABLET, COATED ORAL
Refills: 0 | Status: COMPLETED | COMMUNITY
End: 2022-01-27

## 2022-01-27 RX ORDER — ASPIRIN ENTERIC COATED TABLETS 81 MG 81 MG/1
81 TABLET, DELAYED RELEASE ORAL DAILY
Qty: 45 | Refills: 9 | Status: ACTIVE | COMMUNITY
Start: 2022-01-27

## 2022-01-30 ENCOUNTER — NON-APPOINTMENT (OUTPATIENT)
Age: 37
End: 2022-01-30

## 2022-01-30 LAB
ABO + RH PNL BLD: NORMAL
BACTERIA UR CULT: NORMAL
BLD GP AB SCN SERPL QL: NORMAL
C TRACH RRNA SPEC QL NAA+PROBE: NOT DETECTED
CANDIDA VAG CYTO: NOT DETECTED
G VAGINALIS+PREV SP MTYP VAG QL MICRO: NOT DETECTED
HBV SURFACE AG SER QL: NONREACTIVE
HCV AB SER QL: NONREACTIVE
HCV S/CO RATIO: 0.13 S/CO
HIV1+2 AB SPEC QL IA.RAPID: NONREACTIVE
LEAD BLD-MCNC: <1 UG/DL
MEV IGG FLD QL IA: 48.4 AU/ML
MEV IGG+IGM SER-IMP: POSITIVE
N GONORRHOEA RRNA SPEC QL NAA+PROBE: NOT DETECTED
RUBV IGG FLD-ACNC: 2.2 INDEX
RUBV IGG SER-IMP: POSITIVE
SOURCE AMPLIFICATION: NORMAL
T VAGINALIS VAG QL WET PREP: NOT DETECTED
VZV AB TITR SER: POSITIVE
VZV IGG SER IF-ACNC: 932.9 INDEX

## 2022-02-01 ENCOUNTER — APPOINTMENT (OUTPATIENT)
Dept: CARDIOLOGY | Facility: CLINIC | Age: 37
End: 2022-02-01
Payer: COMMERCIAL

## 2022-02-01 ENCOUNTER — NON-APPOINTMENT (OUTPATIENT)
Age: 37
End: 2022-02-01

## 2022-02-01 VITALS
OXYGEN SATURATION: 99 % | DIASTOLIC BLOOD PRESSURE: 80 MMHG | BODY MASS INDEX: 37.03 KG/M2 | HEART RATE: 97 BPM | WEIGHT: 250 LBS | SYSTOLIC BLOOD PRESSURE: 123 MMHG | HEIGHT: 69 IN

## 2022-02-01 PROCEDURE — 99214 OFFICE O/P EST MOD 30 MIN: CPT

## 2022-02-01 PROCEDURE — 93000 ELECTROCARDIOGRAM COMPLETE: CPT

## 2022-02-01 NOTE — HISTORY OF PRESENT ILLNESS
[FreeTextEntry1] : Yenni is a 36 year old female here for follow up.\par \par I last saw her in 4/2021. Prior to this, she went to the emergency room with an episode of chest pain. \par Workup, including blood work and EKG were unremarkable. Her blood pressure was mildly elevated.\par \par She has a significant history of orthopedic issues/herniated discs, after a fall in 2018. She has been walking with a cane since. She also has a history of diabetes and hypertension. She is currently on lisinopril and metformin. She has no family history of coronary artery disease. She denies toxic habits. \par \par She is feeling well. Her exercise tolerance has limited by her orthopedic issues.\par She is now 13 weeks pregnant. Her lisinopril was appropriately stopped. Her BP has been running fair at home. Her sugars have been higher, and she is now on insulin 6x/day.\par She reports episodes of palpitations and mild dyspnea on exertion.\par \par Echocardiogram with normal LV function, mild concentric LV remodeling in 2020, without change in 2021.\par She is only on tylenol for the orthopedic pain.

## 2022-02-01 NOTE — REVIEW OF SYSTEMS
[Joint Stiffness] : joint stiffness [Negative] : Heme/Lymph [Dyspnea on exertion] : dyspnea during exertion [Palpitations] : palpitations

## 2022-02-01 NOTE — DISCUSSION/SUMMARY
[With Me] : with me [___ Month(s)] : in [unfilled] month(s) [FreeTextEntry1] : Yenni has a history of hypertension and is now 13 weeks pregnant. Her exercise tolerance has generally been limited by her orthopedic issues, then by her respiratory and cardiac status. Her LV function is normal on recent echocardiogram. Her EKG today demonstrates a sinus rhythm without obvious ischemia or chamber enlargement. Her blood pressure is acceptable today. \par \par Her lisinopril has been appropriately stopped. I am going to add labetalol 100 mg po bid, which will help with her BP and palpitations. She will continue to monitor her BP at home. She needs to stay hydrated and to continue to control her sugars.\par  \par I stressed the importance of diet, exercise, and weight loss, to reduce her overall cardiovascular risk. \par I will see her again in 2-3 months.

## 2022-02-03 ENCOUNTER — NON-APPOINTMENT (OUTPATIENT)
Age: 37
End: 2022-02-03

## 2022-02-04 ENCOUNTER — ASOB RESULT (OUTPATIENT)
Age: 37
End: 2022-02-04

## 2022-02-04 ENCOUNTER — APPOINTMENT (OUTPATIENT)
Dept: MATERNAL FETAL MEDICINE | Facility: CLINIC | Age: 37
End: 2022-02-04
Payer: COMMERCIAL

## 2022-02-04 PROCEDURE — G0108 DIAB MANAGE TRN  PER INDIV: CPT | Mod: 95

## 2022-02-08 ENCOUNTER — NON-APPOINTMENT (OUTPATIENT)
Age: 37
End: 2022-02-08

## 2022-02-08 LAB
AR GENE MUT ANL BLD/T: NORMAL
B19V IGG SER QL IA: 8.76 INDEX
B19V IGG+IGM SER-IMP: NORMAL
B19V IGG+IGM SER-IMP: POSITIVE
B19V IGM FLD-ACNC: 0.11 INDEX
B19V IGM SER-ACNC: NEGATIVE
CFTR MUT TESTED BLD/T: NEGATIVE
FMR1 GENE MUT ANL BLD/T: NORMAL
HGB A MFR BLD: 97.6 %
HGB A2 MFR BLD: 2.4 %
HGB FRACT BLD-IMP: NORMAL

## 2022-02-11 ENCOUNTER — APPOINTMENT (OUTPATIENT)
Dept: MATERNAL FETAL MEDICINE | Facility: CLINIC | Age: 37
End: 2022-02-11
Payer: COMMERCIAL

## 2022-02-11 ENCOUNTER — ASOB RESULT (OUTPATIENT)
Age: 37
End: 2022-02-11

## 2022-02-11 PROCEDURE — G0108 DIAB MANAGE TRN  PER INDIV: CPT | Mod: 95

## 2022-02-15 LAB
CLARI ADDITIONAL INFO: NORMAL
CLARI CHROMOSOME 13: NORMAL
CLARI CHROMOSOME 18: NORMAL
CLARI CHROMOSOME 21: NORMAL
CLARI SEX CHROMOSOMES: NORMAL
CLARITEST NIPT: NORMAL
MATERNAL WEIGHT (LBS):: NORMAL
PLEASE INCLUDE GENDER RESULTS ON THIS REPORT:: NORMAL
TYPE OF PREGNANCY:: NORMAL

## 2022-02-17 RX ORDER — LANCETS 33 GAUGE
EACH MISCELLANEOUS
Qty: 1 | Refills: 3 | Status: ACTIVE | COMMUNITY
Start: 2022-02-11 | End: 1900-01-01

## 2022-02-18 ENCOUNTER — APPOINTMENT (OUTPATIENT)
Dept: MATERNAL FETAL MEDICINE | Facility: CLINIC | Age: 37
End: 2022-02-18
Payer: COMMERCIAL

## 2022-02-18 ENCOUNTER — ASOB RESULT (OUTPATIENT)
Age: 37
End: 2022-02-18

## 2022-02-18 PROCEDURE — G0108 DIAB MANAGE TRN  PER INDIV: CPT | Mod: 95

## 2022-02-24 ENCOUNTER — ASOB RESULT (OUTPATIENT)
Age: 37
End: 2022-02-24

## 2022-02-24 ENCOUNTER — APPOINTMENT (OUTPATIENT)
Dept: ANTEPARTUM | Facility: CLINIC | Age: 37
End: 2022-02-24
Payer: COMMERCIAL

## 2022-02-24 ENCOUNTER — NON-APPOINTMENT (OUTPATIENT)
Age: 37
End: 2022-02-24

## 2022-02-24 PROCEDURE — 76805 OB US >/= 14 WKS SNGL FETUS: CPT

## 2022-02-25 ENCOUNTER — NON-APPOINTMENT (OUTPATIENT)
Age: 37
End: 2022-02-25

## 2022-02-25 ENCOUNTER — APPOINTMENT (OUTPATIENT)
Dept: OBGYN | Facility: CLINIC | Age: 37
End: 2022-02-25
Payer: COMMERCIAL

## 2022-02-25 ENCOUNTER — LABORATORY RESULT (OUTPATIENT)
Age: 37
End: 2022-02-25

## 2022-02-25 VITALS
HEIGHT: 69 IN | DIASTOLIC BLOOD PRESSURE: 80 MMHG | WEIGHT: 257 LBS | BODY MASS INDEX: 38.06 KG/M2 | SYSTOLIC BLOOD PRESSURE: 144 MMHG

## 2022-02-25 DIAGNOSIS — D64.9 ANEMIA, UNSPECIFIED: ICD-10-CM

## 2022-02-25 PROCEDURE — 81003 URINALYSIS AUTO W/O SCOPE: CPT | Mod: NC,QW

## 2022-02-25 PROCEDURE — 0502F SUBSEQUENT PRENATAL CARE: CPT

## 2022-03-01 ENCOUNTER — NON-APPOINTMENT (OUTPATIENT)
Age: 37
End: 2022-03-01

## 2022-03-01 LAB
ALBUMIN SERPL ELPH-MCNC: 3.9 G/DL
ALP BLD-CCNC: 69 U/L
ALT SERPL-CCNC: 14 U/L
ANION GAP SERPL CALC-SCNC: 14 MMOL/L
AST SERPL-CCNC: 12 U/L
BASOPHILS # BLD AUTO: 0.03 K/UL
BASOPHILS # BLD AUTO: 0.06 K/UL
BASOPHILS NFR BLD AUTO: 0.3 %
BASOPHILS NFR BLD AUTO: 0.5 %
BILIRUB SERPL-MCNC: <0.2 MG/DL
BUN SERPL-MCNC: 6 MG/DL
CALCIUM SERPL-MCNC: 9.3 MG/DL
CHLORIDE SERPL-SCNC: 102 MMOL/L
CO2 SERPL-SCNC: 19 MMOL/L
CREAT SERPL-MCNC: 0.4 MG/DL
EOSINOPHIL # BLD AUTO: 0.08 K/UL
EOSINOPHIL # BLD AUTO: 0.11 K/UL
EOSINOPHIL NFR BLD AUTO: 0.7 %
EOSINOPHIL NFR BLD AUTO: 1.3 %
FIBRINOGEN PPP COAG.DERIVED-MCNC: 866 MG/DL
GLUCOSE SERPL-MCNC: 126 MG/DL
HCT VFR BLD CALC: 31.2 %
HCT VFR BLD CALC: 34.3 %
HGB BLD-MCNC: 10.4 G/DL
HGB BLD-MCNC: 9.7 G/DL
IMM GRANULOCYTES NFR BLD AUTO: 0.4 %
IMM GRANULOCYTES NFR BLD AUTO: 0.5 %
LDH SERPL-CCNC: 154 U/L
LYMPHOCYTES # BLD AUTO: 1.66 K/UL
LYMPHOCYTES # BLD AUTO: 2.9 K/UL
LYMPHOCYTES NFR BLD AUTO: 19.1 %
LYMPHOCYTES NFR BLD AUTO: 24.2 %
MAN DIFF?: NORMAL
MAN DIFF?: NORMAL
MCHC RBC-ENTMCNC: 23.2 PG
MCHC RBC-ENTMCNC: 24 PG
MCHC RBC-ENTMCNC: 30.3 GM/DL
MCHC RBC-ENTMCNC: 31.1 GM/DL
MCV RBC AUTO: 76.4 FL
MCV RBC AUTO: 77.2 FL
MONOCYTES # BLD AUTO: 0.52 K/UL
MONOCYTES # BLD AUTO: 0.7 K/UL
MONOCYTES NFR BLD AUTO: 5.8 %
MONOCYTES NFR BLD AUTO: 6 %
NEUTROPHILS # BLD AUTO: 6.31 K/UL
NEUTROPHILS # BLD AUTO: 8.19 K/UL
NEUTROPHILS NFR BLD AUTO: 68.4 %
NEUTROPHILS NFR BLD AUTO: 72.8 %
PLATELET # BLD AUTO: 259 K/UL
PLATELET # BLD AUTO: 286 K/UL
POTASSIUM SERPL-SCNC: 4.1 MMOL/L
PROT SERPL-MCNC: 6.9 G/DL
RBC # BLD: 4.04 M/UL
RBC # BLD: 4.49 M/UL
RBC # FLD: 19.3 %
RBC # FLD: 20.2 %
SODIUM SERPL-SCNC: 134 MMOL/L
URATE SERPL-MCNC: 2.6 MG/DL
WBC # FLD AUTO: 11.98 K/UL
WBC # FLD AUTO: 8.67 K/UL

## 2022-03-04 ENCOUNTER — APPOINTMENT (OUTPATIENT)
Dept: MATERNAL FETAL MEDICINE | Facility: CLINIC | Age: 37
End: 2022-03-04
Payer: COMMERCIAL

## 2022-03-04 ENCOUNTER — ASOB RESULT (OUTPATIENT)
Age: 37
End: 2022-03-04

## 2022-03-04 PROCEDURE — G0108 DIAB MANAGE TRN  PER INDIV: CPT | Mod: 95

## 2022-03-04 RX ORDER — INSULIN LISPRO 100 [IU]/ML
100 INJECTION, SOLUTION INTRAVENOUS; SUBCUTANEOUS
Qty: 2 | Refills: 2 | Status: ACTIVE | COMMUNITY
Start: 2022-01-11 | End: 1900-01-01

## 2022-03-09 ENCOUNTER — NON-APPOINTMENT (OUTPATIENT)
Age: 37
End: 2022-03-09

## 2022-03-15 ENCOUNTER — OUTPATIENT (OUTPATIENT)
Dept: OUTPATIENT SERVICES | Age: 37
LOS: 1 days | Discharge: HOME | End: 2022-03-15

## 2022-03-15 ENCOUNTER — NON-APPOINTMENT (OUTPATIENT)
Age: 37
End: 2022-03-15

## 2022-03-16 ENCOUNTER — APPOINTMENT (OUTPATIENT)
Dept: PEDIATRIC CARDIOLOGY | Facility: CLINIC | Age: 37
End: 2022-03-16
Payer: COMMERCIAL

## 2022-03-16 ENCOUNTER — OUTPATIENT (OUTPATIENT)
Dept: OUTPATIENT SERVICES | Facility: HOSPITAL | Age: 37
LOS: 1 days | Discharge: HOME | End: 2022-03-16

## 2022-03-16 DIAGNOSIS — D64.9 ANEMIA, UNSPECIFIED: ICD-10-CM

## 2022-03-16 PROCEDURE — 76820 UMBILICAL ARTERY ECHO: CPT

## 2022-03-16 PROCEDURE — 76827 ECHO EXAM OF FETAL HEART: CPT

## 2022-03-16 PROCEDURE — 76825 ECHO EXAM OF FETAL HEART: CPT

## 2022-03-16 PROCEDURE — 99203 OFFICE O/P NEW LOW 30 MIN: CPT

## 2022-03-16 PROCEDURE — 93325 DOPPLER ECHO COLOR FLOW MAPG: CPT | Mod: 59

## 2022-03-17 ENCOUNTER — ASOB RESULT (OUTPATIENT)
Age: 37
End: 2022-03-17

## 2022-03-17 ENCOUNTER — RESULT REVIEW (OUTPATIENT)
Age: 37
End: 2022-03-17

## 2022-03-17 ENCOUNTER — APPOINTMENT (OUTPATIENT)
Dept: MATERNAL FETAL MEDICINE | Facility: CLINIC | Age: 37
End: 2022-03-17
Payer: COMMERCIAL

## 2022-03-17 ENCOUNTER — APPOINTMENT (OUTPATIENT)
Dept: HEMATOLOGY ONCOLOGY | Facility: CLINIC | Age: 37
End: 2022-03-17
Payer: COMMERCIAL

## 2022-03-17 ENCOUNTER — APPOINTMENT (OUTPATIENT)
Dept: ANTEPARTUM | Facility: CLINIC | Age: 37
End: 2022-03-17
Payer: COMMERCIAL

## 2022-03-17 VITALS
SYSTOLIC BLOOD PRESSURE: 116 MMHG | RESPIRATION RATE: 16 BRPM | BODY MASS INDEX: 40.29 KG/M2 | DIASTOLIC BLOOD PRESSURE: 77 MMHG | HEART RATE: 80 BPM | OXYGEN SATURATION: 99 % | TEMPERATURE: 97.1 F | WEIGHT: 262.79 LBS | HEIGHT: 67.87 IN

## 2022-03-17 VITALS
WEIGHT: 262.25 LBS | HEART RATE: 85 BPM | SYSTOLIC BLOOD PRESSURE: 128 MMHG | OXYGEN SATURATION: 99 % | HEIGHT: 69 IN | BODY MASS INDEX: 38.84 KG/M2 | DIASTOLIC BLOOD PRESSURE: 80 MMHG

## 2022-03-17 LAB
BASOPHILS # BLD AUTO: 0.03 K/UL — SIGNIFICANT CHANGE UP (ref 0–0.2)
BASOPHILS NFR BLD AUTO: 0.3 % — SIGNIFICANT CHANGE UP (ref 0–2)
EOSINOPHIL # BLD AUTO: 0.1 K/UL — SIGNIFICANT CHANGE UP (ref 0–0.5)
EOSINOPHIL NFR BLD AUTO: 1 % — SIGNIFICANT CHANGE UP (ref 0–6)
HCT VFR BLD CALC: 29.4 % — LOW (ref 34.5–45)
HGB BLD-MCNC: 9.6 G/DL — LOW (ref 11.5–15.5)
IMM GRANULOCYTES NFR BLD AUTO: 0.6 % — SIGNIFICANT CHANGE UP (ref 0–1.5)
LYMPHOCYTES # BLD AUTO: 1.66 K/UL — SIGNIFICANT CHANGE UP (ref 1–3.3)
LYMPHOCYTES # BLD AUTO: 16.5 % — SIGNIFICANT CHANGE UP (ref 13–44)
MCHC RBC-ENTMCNC: 25.3 PG — LOW (ref 27–34)
MCHC RBC-ENTMCNC: 32.7 G/DL — SIGNIFICANT CHANGE UP (ref 32–36)
MCV RBC AUTO: 77.4 FL — LOW (ref 80–100)
MONOCYTES # BLD AUTO: 0.4 K/UL — SIGNIFICANT CHANGE UP (ref 0–0.9)
MONOCYTES NFR BLD AUTO: 4 % — SIGNIFICANT CHANGE UP (ref 2–14)
NEUTROPHILS # BLD AUTO: 7.84 K/UL — HIGH (ref 1.8–7.4)
NEUTROPHILS NFR BLD AUTO: 77.6 % — HIGH (ref 43–77)
NRBC # BLD: 0 /100 WBCS — SIGNIFICANT CHANGE UP (ref 0–0)
PLATELET # BLD AUTO: 216 K/UL — SIGNIFICANT CHANGE UP (ref 150–400)
RBC # BLD: 3.8 M/UL — SIGNIFICANT CHANGE UP (ref 3.8–5.2)
RBC # FLD: 18 % — HIGH (ref 10.3–14.5)
RETICS #: 73.3 K/UL — SIGNIFICANT CHANGE UP (ref 25–125)
RETICS/RBC NFR: 1.9 % — SIGNIFICANT CHANGE UP (ref 0.5–2.5)
WBC # BLD: 10.09 K/UL — SIGNIFICANT CHANGE UP (ref 3.8–10.5)
WBC # FLD AUTO: 10.09 K/UL — SIGNIFICANT CHANGE UP (ref 3.8–10.5)

## 2022-03-17 PROCEDURE — 99205 OFFICE O/P NEW HI 60 MIN: CPT

## 2022-03-17 PROCEDURE — 99215 OFFICE O/P EST HI 40 MIN: CPT

## 2022-03-17 PROCEDURE — 76811 OB US DETAILED SNGL FETUS: CPT

## 2022-03-17 PROCEDURE — 99215 OFFICE O/P EST HI 40 MIN: CPT | Mod: 25

## 2022-03-17 RX ORDER — ASCORBIC ACID 500 MG
500 TABLET ORAL DAILY
Refills: 0 | Status: ACTIVE | COMMUNITY

## 2022-03-18 LAB
CREAT SPEC-SCNC: 85 MG/DL
CREAT/PROT UR: 0.1 RATIO
FERRITIN SERPL-MCNC: 30 NG/ML
FOLATE SERPL-MCNC: >20 NG/ML
IRON SATN MFR SERPL: 25 %
IRON SERPL-MCNC: 92 UG/DL
PROT UR-MCNC: 9 MG/DL
TIBC SERPL-MCNC: 374 UG/DL
UIBC SERPL-MCNC: 282 UG/DL
VIT B12 SERPL-MCNC: 590 PG/ML

## 2022-03-21 RX ORDER — LABETALOL HYDROCHLORIDE 100 MG/1
100 TABLET, FILM COATED ORAL
Qty: 180 | Refills: 2 | Status: ACTIVE | COMMUNITY
Start: 2022-02-01

## 2022-03-25 ENCOUNTER — NON-APPOINTMENT (OUTPATIENT)
Age: 37
End: 2022-03-25

## 2022-03-25 ENCOUNTER — APPOINTMENT (OUTPATIENT)
Dept: OBGYN | Facility: CLINIC | Age: 37
End: 2022-03-25
Payer: COMMERCIAL

## 2022-03-25 VITALS
BODY MASS INDEX: 39.92 KG/M2 | HEIGHT: 68 IN | WEIGHT: 263.38 LBS | DIASTOLIC BLOOD PRESSURE: 84 MMHG | SYSTOLIC BLOOD PRESSURE: 128 MMHG

## 2022-03-25 PROCEDURE — 81003 URINALYSIS AUTO W/O SCOPE: CPT | Mod: NC,QW

## 2022-03-25 PROCEDURE — 0502F SUBSEQUENT PRENATAL CARE: CPT

## 2022-03-31 ENCOUNTER — ASOB RESULT (OUTPATIENT)
Age: 37
End: 2022-03-31

## 2022-03-31 ENCOUNTER — APPOINTMENT (OUTPATIENT)
Dept: MATERNAL FETAL MEDICINE | Facility: CLINIC | Age: 37
End: 2022-03-31
Payer: COMMERCIAL

## 2022-03-31 ENCOUNTER — NON-APPOINTMENT (OUTPATIENT)
Age: 37
End: 2022-03-31

## 2022-03-31 PROCEDURE — G0108 DIAB MANAGE TRN  PER INDIV: CPT | Mod: 95

## 2022-03-31 RX ORDER — BLOOD SUGAR DIAGNOSTIC
STRIP MISCELLANEOUS
Qty: 200 | Refills: 3 | Status: ACTIVE | COMMUNITY
Start: 2022-02-11 | End: 1900-01-01

## 2022-03-31 RX ORDER — PEN NEEDLE, DIABETIC 29 G X1/2"
32G X 4 MM NEEDLE, DISPOSABLE MISCELLANEOUS
Qty: 2 | Refills: 2 | Status: ACTIVE | COMMUNITY
Start: 2022-01-11 | End: 1900-01-01

## 2022-04-01 ENCOUNTER — APPOINTMENT (OUTPATIENT)
Dept: PEDIATRIC CARDIOLOGY | Facility: CLINIC | Age: 37
End: 2022-04-01
Payer: COMMERCIAL

## 2022-04-01 PROCEDURE — 99214 OFFICE O/P EST MOD 30 MIN: CPT

## 2022-04-01 PROCEDURE — 93325 DOPPLER ECHO COLOR FLOW MAPG: CPT | Mod: 59

## 2022-04-01 PROCEDURE — 76820 UMBILICAL ARTERY ECHO: CPT

## 2022-04-01 PROCEDURE — 76826 ECHO EXAM OF FETAL HEART: CPT

## 2022-04-01 PROCEDURE — 76828 ECHO EXAM OF FETAL HEART: CPT

## 2022-04-03 LAB — BACTERIA UR CULT: NORMAL

## 2022-04-05 ENCOUNTER — APPOINTMENT (OUTPATIENT)
Dept: MATERNAL FETAL MEDICINE | Facility: CLINIC | Age: 37
End: 2022-04-05

## 2022-04-07 ENCOUNTER — APPOINTMENT (OUTPATIENT)
Dept: OPHTHALMOLOGY | Facility: CLINIC | Age: 37
End: 2022-04-07
Payer: COMMERCIAL

## 2022-04-07 ENCOUNTER — APPOINTMENT (OUTPATIENT)
Dept: ANTEPARTUM | Facility: CLINIC | Age: 37
End: 2022-04-07
Payer: COMMERCIAL

## 2022-04-07 ENCOUNTER — NON-APPOINTMENT (OUTPATIENT)
Age: 37
End: 2022-04-07

## 2022-04-07 ENCOUNTER — APPOINTMENT (OUTPATIENT)
Dept: MATERNAL FETAL MEDICINE | Facility: CLINIC | Age: 37
End: 2022-04-07
Payer: COMMERCIAL

## 2022-04-07 ENCOUNTER — ASOB RESULT (OUTPATIENT)
Age: 37
End: 2022-04-07

## 2022-04-07 VITALS
OXYGEN SATURATION: 99 % | DIASTOLIC BLOOD PRESSURE: 80 MMHG | BODY MASS INDEX: 39.1 KG/M2 | HEIGHT: 69 IN | WEIGHT: 264 LBS | SYSTOLIC BLOOD PRESSURE: 134 MMHG | HEART RATE: 92 BPM

## 2022-04-07 PROCEDURE — 92004 COMPRE OPH EXAM NEW PT 1/>: CPT

## 2022-04-07 PROCEDURE — 99215 OFFICE O/P EST HI 40 MIN: CPT

## 2022-04-07 PROCEDURE — G0108 DIAB MANAGE TRN  PER INDIV: CPT

## 2022-04-07 PROCEDURE — 76816 OB US FOLLOW-UP PER FETUS: CPT

## 2022-04-14 ENCOUNTER — APPOINTMENT (OUTPATIENT)
Dept: MATERNAL FETAL MEDICINE | Facility: CLINIC | Age: 37
End: 2022-04-14

## 2022-04-15 ENCOUNTER — APPOINTMENT (OUTPATIENT)
Dept: MATERNAL FETAL MEDICINE | Facility: CLINIC | Age: 37
End: 2022-04-15

## 2022-04-15 ENCOUNTER — APPOINTMENT (OUTPATIENT)
Dept: OBGYN | Facility: CLINIC | Age: 37
End: 2022-04-15
Payer: COMMERCIAL

## 2022-04-15 VITALS
SYSTOLIC BLOOD PRESSURE: 112 MMHG | DIASTOLIC BLOOD PRESSURE: 80 MMHG | HEIGHT: 69 IN | WEIGHT: 265 LBS | BODY MASS INDEX: 39.25 KG/M2

## 2022-04-15 PROCEDURE — 0502F SUBSEQUENT PRENATAL CARE: CPT

## 2022-04-15 PROCEDURE — 81003 URINALYSIS AUTO W/O SCOPE: CPT | Mod: NC,QW

## 2022-04-18 ENCOUNTER — APPOINTMENT (OUTPATIENT)
Dept: ANTEPARTUM | Facility: CLINIC | Age: 37
End: 2022-04-18

## 2022-04-19 DIAGNOSIS — N76.0 ACUTE VAGINITIS: ICD-10-CM

## 2022-04-19 DIAGNOSIS — B96.89 ACUTE VAGINITIS: ICD-10-CM

## 2022-04-19 LAB
BACTERIA UR CULT: NORMAL
CANDIDA VAG CYTO: NOT DETECTED
G VAGINALIS+PREV SP MTYP VAG QL MICRO: DETECTED
T VAGINALIS VAG QL WET PREP: NOT DETECTED

## 2022-04-19 RX ORDER — CLINDAMYCIN PHOSPHATE 20 MG/G
2 CREAM VAGINAL DAILY
Qty: 1 | Refills: 1 | Status: ACTIVE | COMMUNITY
Start: 2022-04-19 | End: 1900-01-01

## 2022-04-21 ENCOUNTER — ASOB RESULT (OUTPATIENT)
Age: 37
End: 2022-04-21

## 2022-04-21 ENCOUNTER — APPOINTMENT (OUTPATIENT)
Dept: MATERNAL FETAL MEDICINE | Facility: CLINIC | Age: 37
End: 2022-04-21
Payer: COMMERCIAL

## 2022-04-21 PROCEDURE — G0108 DIAB MANAGE TRN  PER INDIV: CPT | Mod: 95

## 2022-04-22 NOTE — CONSULT LETTER
[Dear  ___] : Dear  [unfilled], [Consult Letter:] : I had the pleasure of evaluating your patient, [unfilled]. [Please see my note below.] : Please see my note below. [Consult Closing:] : Thank you very much for allowing me to participate in the care of this patient.  If you have any questions, please do not hesitate to contact me. [Sincerely,] : Sincerely, [DrTomasz  ___] : Dr. LARA [FreeTextEntry2] : Eimlia Zavala MD\par [address]\par [address]\par [place] [address]\par \par  [FreeTextEntry3] : \par Bhavna Ferrer M.D.\par \par  of Medicine\par [facility] [employer] [facility]\par [facility] \par [facility]\par [address] [place] [address] \[place] [address] \par ph: [phone]\par fax: [phone]

## 2022-04-22 NOTE — REVIEW OF SYSTEMS
[Fever] : no fever [Nosebleeds] : no nosebleeds [Chest Pain] : no chest pain [Shortness Of Breath] : no shortness of breath [Cough] : no cough [Vomiting] : no vomiting [Diarrhea] : no diarrhea [Dysuria] : no dysuria [Dizziness] : no dizziness [Fainting] : no fainting [Easy Bleeding] : no tendency for easy bleeding [Easy Bruising] : no tendency for easy bruising [FreeTextEntry8] : no hematuria

## 2022-04-22 NOTE — PHYSICAL EXAM
[Normal] : affect appropriate [de-identified] : anicteric [de-identified] : RRR, normal S1S2 [de-identified] : no pitting edema [de-identified] : gravid, normoactive bowel sounds [de-identified] : no cervical/SCV adenopathy [de-identified] : no rash, no ecchymoses, purpura [de-identified] : A & O x 4

## 2022-04-22 NOTE — HISTORY OF PRESENT ILLNESS
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 She will undergo elective  delivery, date unknown at present time. \par She states she did not have anemia as a baby or child. She denies menorrhagia including from teenage years. She denies being told by medical practitioner that she was anemic until several months after the delivery of her second child in  by . She states PCP informed her of anemia and started her on iron pills. She recalls that her hemoglobin went back up to normal range. She stopped the iron pills. During her routine yearly physical in , she was told she had iron deficiency anemia and she started iron pills. She states she has been taking Ferrous sulfate 325 mg po daily since , but her PCP has still told her that her "iron level" is low. She denies epistaxis, spontaneous bruising, menorrhagia, other active bleeding, including no melena, BRBPR. She denies personal history of thrombocytopenia or bleeding disorder. \par \par Outside Lab results(on patient's phone) from 17, during third pregnancy with MARCIAL in 2018- Hgb- 10.6, Hct- 32.5, MCV- 73, RDW- 19.5%; Platelets- 246,000. \par Labs from [facility] ED on 21- Hgb- 10.6, Hct- 34.7, MCV- 73. She was evaluated for dizziness and high glucose level\par 22- Hgb electrophoresis- normal pattern\par \par \par Transfusion history- Negative. \par The patient denies any event in the past where a physician recommended to her that she needed to have a transfusion. \par \par Family Medical History- no bleeding disorders \par She is not aware of family members having sickle cell disease or thalassemia. \par \par Past surgical history as documented- she denies excessive unexpected bleeding during or after surgery.

## 2022-04-22 NOTE — RESULTS/DATA
[FreeTextEntry1] : WBC- 10.0 with normal differential; Hgb- 9.6, Hct- 29.4, MCV- 77.4; Platelets- 216,000\par PBS- WBC- predominantly neutrophils, mature lymphocytes; RBC- HC MC, few target cells; Platelets- normal size and number

## 2022-04-22 NOTE — REASON FOR VISIT
[Initial Consultation] : an initial consultation for [FreeTextEntry2] : anemia during pregnancy in second trimester in patient that refuses transfusion of blood products due to [employer] beliefs

## 2022-04-22 NOTE — ASSESSMENT
[FreeTextEntry1] : 1) Anemia-  She denies being told by medical practitioner that she was anemic until several months after the delivery of her second child in  by . She states PCP informed her of anemia and started her on iron pills. She recalls that her hemoglobin went back up to normal range. She stopped the iron pills. During her routine yearly physical in 2016, she was told she had iron deficiency anemia and she started iron pills. She states she has been taking Ferrous sulfate 325 mg po daily since 2016, but her PCP has still told her that her "iron level" is low. \par Plan- check iron levels to assess for iron deficiency anemia- Iron studies within normal range on 3/17/22\par Check Vitamin B-12 and Folate level to rule out deficiency state- Of note, levels within normal range on 3/17/22\par Continue Ferrous sulfate and PNV daily\par \par 2) Refusal of blood transfusions as patient is Mandaen-\par I had a discussion with the patient regarding information on the Informed Consent for Blood Avoidance, Blood Refusal and Blood Management Form. The patient states that she Refuses transfusion of red blood cells, platelets, and fresh frozen plasma under any circumstances, even if health care providers believe that such are necessary to preserve her life. She refuses to pre-donate and store her blood for later infusion. \par \par She will accept medical treatment with minor blood fractions including Albumin, Erythropoietin, Fibrin Sealant, Human Immunoglobulin, RhoGAM, Cryoprecipitate, Humate-P, Prothrombin Complex Concentrate, FEIBA, if clinically indicated. She will accept medical treatment with Factor VII A, Factor VIII recombinant, Factor IX recombinant, Factor XIII recombinant, if clinically indicated. \par \par She will only accept the use of the Cell Saver where extracorporeal circulation is a closed system without blood storage. \par \par She has submitted a copy of her legally executed [place] health care proxy to her primary obstetrician and to High Risk Maternal Fetal Medicine Department.

## 2022-04-25 ENCOUNTER — OUTPATIENT (OUTPATIENT)
Dept: OUTPATIENT SERVICES | Facility: HOSPITAL | Age: 37
LOS: 1 days | Discharge: HOME | End: 2022-04-25

## 2022-04-25 DIAGNOSIS — D64.9 ANEMIA, UNSPECIFIED: ICD-10-CM

## 2022-04-28 ENCOUNTER — RESULT REVIEW (OUTPATIENT)
Age: 37
End: 2022-04-28

## 2022-04-28 ENCOUNTER — APPOINTMENT (OUTPATIENT)
Dept: HEMATOLOGY ONCOLOGY | Facility: CLINIC | Age: 37
End: 2022-04-28
Payer: COMMERCIAL

## 2022-04-28 ENCOUNTER — OUTPATIENT (OUTPATIENT)
Dept: OUTPATIENT SERVICES | Facility: HOSPITAL | Age: 37
LOS: 1 days | End: 2022-04-28
Payer: COMMERCIAL

## 2022-04-28 VITALS
OXYGEN SATURATION: 98 % | DIASTOLIC BLOOD PRESSURE: 73 MMHG | SYSTOLIC BLOOD PRESSURE: 109 MMHG | RESPIRATION RATE: 16 BRPM | HEART RATE: 87 BPM | TEMPERATURE: 96.8 F | BODY MASS INDEX: 38.91 KG/M2 | WEIGHT: 263.45 LBS

## 2022-04-28 DIAGNOSIS — O99.012 ANEMIA COMPLICATING PREGNANCY, SECOND TRIMESTER: ICD-10-CM

## 2022-04-28 LAB
BASOPHILS # BLD AUTO: 0.04 K/UL — SIGNIFICANT CHANGE UP (ref 0–0.2)
BASOPHILS NFR BLD AUTO: 0.4 % — SIGNIFICANT CHANGE UP (ref 0–2)
EOSINOPHIL # BLD AUTO: 0.09 K/UL — SIGNIFICANT CHANGE UP (ref 0–0.5)
EOSINOPHIL NFR BLD AUTO: 0.9 % — SIGNIFICANT CHANGE UP (ref 0–6)
HCT VFR BLD CALC: 30.8 % — LOW (ref 34.5–45)
HGB BLD-MCNC: 9.9 G/DL — LOW (ref 11.5–15.5)
IMM GRANULOCYTES NFR BLD AUTO: 0.5 % — SIGNIFICANT CHANGE UP (ref 0–1.5)
LYMPHOCYTES # BLD AUTO: 1.64 K/UL — SIGNIFICANT CHANGE UP (ref 1–3.3)
LYMPHOCYTES # BLD AUTO: 17.3 % — SIGNIFICANT CHANGE UP (ref 13–44)
MCHC RBC-ENTMCNC: 25.9 PG — LOW (ref 27–34)
MCHC RBC-ENTMCNC: 32.1 G/DL — SIGNIFICANT CHANGE UP (ref 32–36)
MCV RBC AUTO: 80.6 FL — SIGNIFICANT CHANGE UP (ref 80–100)
MONOCYTES # BLD AUTO: 0.41 K/UL — SIGNIFICANT CHANGE UP (ref 0–0.9)
MONOCYTES NFR BLD AUTO: 4.3 % — SIGNIFICANT CHANGE UP (ref 2–14)
NEUTROPHILS # BLD AUTO: 7.25 K/UL — SIGNIFICANT CHANGE UP (ref 1.8–7.4)
NEUTROPHILS NFR BLD AUTO: 76.6 % — SIGNIFICANT CHANGE UP (ref 43–77)
NRBC # BLD: 0 /100 WBCS — SIGNIFICANT CHANGE UP (ref 0–0)
PLATELET # BLD AUTO: 222 K/UL — SIGNIFICANT CHANGE UP (ref 150–400)
RBC # BLD: 3.82 M/UL — SIGNIFICANT CHANGE UP (ref 3.8–5.2)
RBC # FLD: 16.2 % — HIGH (ref 10.3–14.5)
WBC # BLD: 9.48 K/UL — SIGNIFICANT CHANGE UP (ref 3.8–10.5)
WBC # FLD AUTO: 9.48 K/UL — SIGNIFICANT CHANGE UP (ref 3.8–10.5)

## 2022-04-28 PROCEDURE — 99213 OFFICE O/P EST LOW 20 MIN: CPT

## 2022-04-28 PROCEDURE — 86901 BLOOD TYPING SEROLOGIC RH(D): CPT

## 2022-04-28 PROCEDURE — 86900 BLOOD TYPING SEROLOGIC ABO: CPT

## 2022-04-28 PROCEDURE — 86850 RBC ANTIBODY SCREEN: CPT

## 2022-04-28 RX ORDER — CHLORHEXIDINE GLUCONATE 4 %
325 (65 FE) LIQUID (ML) TOPICAL TWICE DAILY
Refills: 0 | Status: ACTIVE | COMMUNITY

## 2022-04-28 NOTE — HISTORY OF PRESENT ILLNESS
[de-identified] : 36 year old  Bahai female currently at 20 weeks gestation with MARCIAL on 22. She will undergo elective  delivery, date unknown at present time. \par She states she did not have anemia as a baby or child. She denies menorrhagia including from teenage years. She denies being told by medical practitioner that she was anemic until several months after the delivery of her second child in  by . She states PCP informed her of anemia and started her on iron pills. She recalls that her hemoglobin went back up to normal range. She stopped the iron pills. During her routine yearly physical in , she was told she had iron deficiency anemia and she started iron pills. She states she has been taking Ferrous sulfate 325 mg po daily since , but her PCP has still told her that her "iron level" is low. She denies epistaxis, spontaneous bruising, menorrhagia, other active bleeding, including no melena, BRBPR. She denies personal history of thrombocytopenia or bleeding disorder. \par \par Outside Lab results(on patient's phone) from 17, during third pregnancy with MARCIAL in 2018- Hgb- 10.6, Hct- 32.5, MCV- 73, RDW- 19.5%; Platelets- 246,000. \par Labs from [facility] ED on 21- Hgb- 10.6, Hct- 34.7, MCV- 73. She was evaluated for dizziness and high glucose level\par 22- Hgb electrophoresis- normal pattern\par \par \par Transfusion history- Negative. \par The patient denies any event in the past where a physician recommended to her that she needed to have a transfusion. \par \par Family Medical History- no bleeding disorders \par She is not aware of family members having sickle cell disease or thalassemia. \par \par Past surgical history as documented- she denies excessive unexpected bleeding during or after surgery.  [de-identified] : 22:\par She increased Ferrous sulfate to twice daily around 22. She is currently at 26 weeks gestation with MARCIAL on 22. She will undergo elective  delivery, date unknown at present time. She has mild fatigue and good appetite. She denies bleeding, fever, shortness of breath.

## 2022-04-28 NOTE — RESULTS/DATA
[FreeTextEntry1] : (Today) WBC- 9.48 with normal differential; Hgb- 9.9, Hct- 30.8, MCV- 80.6; Platelets- 222,000\par \par (3/17/22)PBS- WBC- predominantly neutrophils, mature lymphocytes; RBC- HC MC, few target cells; Platelets- normal size and number

## 2022-04-28 NOTE — ASSESSMENT
[FreeTextEntry1] : 1) Anemia-  She denies being told by medical practitioner that she was anemic until several months after the delivery of her second child in  by . She states PCP informed her of anemia and started her on iron pills. She recalls that her hemoglobin went back up to normal range. She stopped the iron pills. During her routine yearly physical in 2016, she was told she had iron deficiency anemia and she started iron pills. She states she has been taking Ferrous sulfate 325 mg po daily since 2016, but her PCP has still told her that her "iron level" is low. \par Plan- check iron levels to assess for iron deficiency anemia- Iron studies within normal range on 3/17/22\par Check Vitamin B-12 and Folate level to rule out deficiency state- Of note, levels within normal range on 3/17/22\par Continue Ferrous sulfate twice daily, Vitamin C once daily, and PNV daily\par Increase dietary intake of foods high in iron content\par I discussed with patient about treatment with JESSICA(Procrit) weekly, starting 4 weeks before delivery if her hemoglobin continues to decrease prior to  delivery. \par \par 2) Refusal of blood transfusions as patient is Confucianism-\par I had a discussion with the patient regarding information on the Informed Consent for Blood Avoidance, Blood Refusal and Blood Management Form. The patient states that she Refuses transfusion of red blood cells, platelets, and fresh frozen plasma under any circumstances, even if health care providers believe that such are necessary to preserve her life. She refuses to pre-donate and store her blood for later infusion. \par \par She will accept medical treatment with minor blood fractions including Albumin, Erythropoietin, Fibrin Sealant, Human Immunoglobulin, RhoGAM, Cryoprecipitate, Humate-P, Prothrombin Complex Concentrate, FEIBA, if clinically indicated. She will accept medical treatment with Factor VII A, Factor VIII recombinant, Factor IX recombinant, Factor XIII recombinant, if clinically indicated. \par \par She will only accept the use of the Cell Saver where extracorporeal circulation is a closed system without blood storage. \par \par She has submitted a copy of her legally executed [place] health care proxy to her primary obstetrician and to High Risk Maternal Fetal Medicine Department. \par \par RTC in 1 month

## 2022-04-28 NOTE — REASON FOR VISIT
[Follow-Up Visit] : a follow-up visit for [FreeTextEntry2] : anemia during pregnancy in second trimester in patient that refuses transfusion of blood products due to Evangelical beliefs

## 2022-04-28 NOTE — CONSULT LETTER
[Dear  ___] : Dear  [unfilled], [Courtesy Letter:] : I had the pleasure of seeing your patient, [unfilled], in my office today. [Please see my note below.] : Please see my note below. [Consult Closing:] : Thank you very much for allowing me to participate in the care of this patient.  If you have any questions, please do not hesitate to contact me. [Sincerely,] : Sincerely, [DrTomasz  ___] : Dr. LARA [FreeTextEntry2] : Emilia Zavala MD\par [address]\par [address]\par [place] [address]\par \par  [FreeTextEntry3] : \par Bhavna Ferrer M.D.\par \par  of Medicine\par [employer] [facility] [employer]\par [facility] \par [facility]\par [address] [place] [address] \[place] [address] \par ph: [phone]\par fax: [phone]

## 2022-04-28 NOTE — PHYSICAL EXAM
[Normal] : affect appropriate [de-identified] : anicteric [de-identified] : RRR, normal S1S2 [de-identified] : no pitting edema [de-identified] : gravid, normoactive bowel sounds [de-identified] : no ecchymoses, petechiae [de-identified] : A & O x 4

## 2022-05-02 ENCOUNTER — APPOINTMENT (OUTPATIENT)
Dept: CARDIOLOGY | Facility: CLINIC | Age: 37
End: 2022-05-02
Payer: COMMERCIAL

## 2022-05-02 ENCOUNTER — NON-APPOINTMENT (OUTPATIENT)
Age: 37
End: 2022-05-02

## 2022-05-02 VITALS — DIASTOLIC BLOOD PRESSURE: 78 MMHG | SYSTOLIC BLOOD PRESSURE: 122 MMHG

## 2022-05-02 VITALS
HEIGHT: 69 IN | WEIGHT: 265 LBS | OXYGEN SATURATION: 100 % | HEART RATE: 98 BPM | BODY MASS INDEX: 39.25 KG/M2 | DIASTOLIC BLOOD PRESSURE: 81 MMHG | SYSTOLIC BLOOD PRESSURE: 128 MMHG

## 2022-05-02 DIAGNOSIS — I10 ESSENTIAL (PRIMARY) HYPERTENSION: ICD-10-CM

## 2022-05-02 DIAGNOSIS — R00.2 PALPITATIONS: ICD-10-CM

## 2022-05-02 PROCEDURE — 99214 OFFICE O/P EST MOD 30 MIN: CPT

## 2022-05-02 PROCEDURE — 93000 ELECTROCARDIOGRAM COMPLETE: CPT

## 2022-05-02 NOTE — REVIEW OF SYSTEMS
[Dyspnea on exertion] : dyspnea during exertion [Palpitations] : palpitations [Joint Stiffness] : joint stiffness [Negative] : Heme/Lymph

## 2022-05-02 NOTE — DISCUSSION/SUMMARY
[With Me] : with me [___ Month(s)] : in [unfilled] month(s) [FreeTextEntry1] : Yenni has a history of hypertension and is now 26 weeks pregnant. Her exercise tolerance has generally been limited by her orthopedic issues, than by her respiratory and cardiac status. Her LV function is normal on recent echocardiogram. Her EKG today demonstrates a sinus rhythm without obvious ischemia or chamber enlargement. Her blood pressure is acceptable today. \par \par She will remain on labetalol 100 bid. If her palpitations increase or BP trends up, the dose will be increased accordingly. She will continue to monitor her BP at home. She needs to stay hydrated and to continue to control her sugars. \par  \par I stressed the importance of diet, exercise, and weight loss, to reduce her overall cardiovascular risk. \par I will see her again in 2 months.

## 2022-05-02 NOTE — HISTORY OF PRESENT ILLNESS
[FreeTextEntry1] : Yenni is a 36 year old female here for follow up.\par \par I last saw her in 2/2022. Prior to this, she went to the emergency room with an episode of chest pain. \par Workup, including blood work and EKG were unremarkable. Her blood pressure was mildly elevated.\par \par She has a significant history of orthopedic issues/herniated discs, after a fall in 2018. She has been walking with a cane since. She also has a history of diabetes and hypertension. She has no family history of coronary artery disease. She denies toxic habits. \par \par She is feeling well. Her exercise tolerance has limited by her orthopedic issues.\par \par She is now 26 weeks pregnant. Her lisinopril was appropriately stopped. Her BP has been running fair at home. Her sugars have been higher, and she is now on insulin 5x/day.\par She reports episodes of palpitations and mild dyspnea on exertion. The palpitations have been more noticeable over the last few days.\par She has had some vision issues, with some mild dizziness.\par \par Echocardiogram with normal LV function, mild concentric LV remodeling in 2020, without change in 2021.

## 2022-05-05 ENCOUNTER — APPOINTMENT (OUTPATIENT)
Dept: MATERNAL FETAL MEDICINE | Facility: CLINIC | Age: 37
End: 2022-05-05
Payer: COMMERCIAL

## 2022-05-05 ENCOUNTER — ASOB RESULT (OUTPATIENT)
Age: 37
End: 2022-05-05

## 2022-05-05 ENCOUNTER — APPOINTMENT (OUTPATIENT)
Dept: ANTEPARTUM | Facility: CLINIC | Age: 37
End: 2022-05-05
Payer: COMMERCIAL

## 2022-05-05 VITALS
HEIGHT: 69 IN | HEART RATE: 90 BPM | BODY MASS INDEX: 38.71 KG/M2 | SYSTOLIC BLOOD PRESSURE: 119 MMHG | WEIGHT: 261.38 LBS | OXYGEN SATURATION: 98 % | DIASTOLIC BLOOD PRESSURE: 75 MMHG

## 2022-05-05 PROCEDURE — 99215 OFFICE O/P EST HI 40 MIN: CPT | Mod: 25

## 2022-05-05 PROCEDURE — 99213 OFFICE O/P EST LOW 20 MIN: CPT | Mod: 25

## 2022-05-05 PROCEDURE — 76816 OB US FOLLOW-UP PER FETUS: CPT

## 2022-05-05 RX ORDER — METFORMIN HYDROCHLORIDE 500 MG/1
500 TABLET, COATED ORAL
Qty: 2 | Refills: 1 | Status: ACTIVE | COMMUNITY
Start: 1900-01-01 | End: 1900-01-01

## 2022-05-13 ENCOUNTER — APPOINTMENT (OUTPATIENT)
Dept: OBGYN | Facility: CLINIC | Age: 37
End: 2022-05-13
Payer: COMMERCIAL

## 2022-05-13 ENCOUNTER — NON-APPOINTMENT (OUTPATIENT)
Age: 37
End: 2022-05-13

## 2022-05-13 VITALS
BODY MASS INDEX: 39.1 KG/M2 | WEIGHT: 264 LBS | SYSTOLIC BLOOD PRESSURE: 120 MMHG | DIASTOLIC BLOOD PRESSURE: 80 MMHG | HEIGHT: 69 IN

## 2022-05-13 DIAGNOSIS — O24.119 PRE-EXISTING TYPE 2 DIABETES MELLITUS, TYPE 2, IN PREGNANCY, UNSPECIFIED TRIMESTER: ICD-10-CM

## 2022-05-13 PROCEDURE — 81003 URINALYSIS AUTO W/O SCOPE: CPT | Mod: NC,QW

## 2022-05-13 PROCEDURE — 0502F SUBSEQUENT PRENATAL CARE: CPT

## 2022-05-19 ENCOUNTER — APPOINTMENT (OUTPATIENT)
Dept: MATERNAL FETAL MEDICINE | Facility: CLINIC | Age: 37
End: 2022-05-19
Payer: COMMERCIAL

## 2022-05-19 ENCOUNTER — ASOB RESULT (OUTPATIENT)
Age: 37
End: 2022-05-19

## 2022-05-19 PROCEDURE — G0108 DIAB MANAGE TRN  PER INDIV: CPT | Mod: 95

## 2022-05-22 ENCOUNTER — OUTPATIENT (OUTPATIENT)
Dept: OUTPATIENT SERVICES | Facility: HOSPITAL | Age: 37
LOS: 1 days | Discharge: HOME | End: 2022-05-22

## 2022-05-22 DIAGNOSIS — D64.9 ANEMIA, UNSPECIFIED: ICD-10-CM

## 2022-05-26 ENCOUNTER — APPOINTMENT (OUTPATIENT)
Dept: MATERNAL FETAL MEDICINE | Facility: CLINIC | Age: 37
End: 2022-05-26
Payer: COMMERCIAL

## 2022-05-26 ENCOUNTER — APPOINTMENT (OUTPATIENT)
Dept: HEMATOLOGY ONCOLOGY | Facility: CLINIC | Age: 37
End: 2022-05-26
Payer: COMMERCIAL

## 2022-05-26 ENCOUNTER — ASOB RESULT (OUTPATIENT)
Age: 37
End: 2022-05-26

## 2022-05-26 ENCOUNTER — RESULT REVIEW (OUTPATIENT)
Age: 37
End: 2022-05-26

## 2022-05-26 VITALS
WEIGHT: 266.3 LBS | DIASTOLIC BLOOD PRESSURE: 74 MMHG | TEMPERATURE: 97.1 F | HEART RATE: 94 BPM | SYSTOLIC BLOOD PRESSURE: 114 MMHG | BODY MASS INDEX: 39.33 KG/M2 | OXYGEN SATURATION: 99 % | RESPIRATION RATE: 16 BRPM

## 2022-05-26 DIAGNOSIS — O99.012 ANEMIA COMPLICATING PREGNANCY, SECOND TRIMESTER: ICD-10-CM

## 2022-05-26 LAB
BASOPHILS # BLD AUTO: 0.03 K/UL — SIGNIFICANT CHANGE UP (ref 0–0.2)
BASOPHILS NFR BLD AUTO: 0.4 % — SIGNIFICANT CHANGE UP (ref 0–2)
EOSINOPHIL # BLD AUTO: 0.07 K/UL — SIGNIFICANT CHANGE UP (ref 0–0.5)
EOSINOPHIL NFR BLD AUTO: 0.9 % — SIGNIFICANT CHANGE UP (ref 0–6)
HCT VFR BLD CALC: 31.8 % — LOW (ref 34.5–45)
HGB BLD-MCNC: 10 G/DL — LOW (ref 11.5–15.5)
IMM GRANULOCYTES NFR BLD AUTO: 0.7 % — SIGNIFICANT CHANGE UP (ref 0–1.5)
LYMPHOCYTES # BLD AUTO: 1.72 K/UL — SIGNIFICANT CHANGE UP (ref 1–3.3)
LYMPHOCYTES # BLD AUTO: 21.4 % — SIGNIFICANT CHANGE UP (ref 13–44)
MCHC RBC-ENTMCNC: 25.7 PG — LOW (ref 27–34)
MCHC RBC-ENTMCNC: 31.4 G/DL — LOW (ref 32–36)
MCV RBC AUTO: 81.7 FL — SIGNIFICANT CHANGE UP (ref 80–100)
MONOCYTES # BLD AUTO: 0.37 K/UL — SIGNIFICANT CHANGE UP (ref 0–0.9)
MONOCYTES NFR BLD AUTO: 4.6 % — SIGNIFICANT CHANGE UP (ref 2–14)
NEUTROPHILS # BLD AUTO: 5.77 K/UL — SIGNIFICANT CHANGE UP (ref 1.8–7.4)
NEUTROPHILS NFR BLD AUTO: 72 % — SIGNIFICANT CHANGE UP (ref 43–77)
NRBC # BLD: 0 /100 WBCS — SIGNIFICANT CHANGE UP (ref 0–0)
PLATELET # BLD AUTO: 207 K/UL — SIGNIFICANT CHANGE UP (ref 150–400)
RBC # BLD: 3.89 M/UL — SIGNIFICANT CHANGE UP (ref 3.8–5.2)
RBC # FLD: 15.7 % — HIGH (ref 10.3–14.5)
RETICS #: 82.1 K/UL — SIGNIFICANT CHANGE UP (ref 25–125)
RETICS/RBC NFR: 2.1 % — SIGNIFICANT CHANGE UP (ref 0.5–2.5)
WBC # BLD: 8.02 K/UL — SIGNIFICANT CHANGE UP (ref 3.8–10.5)
WBC # FLD AUTO: 8.02 K/UL — SIGNIFICANT CHANGE UP (ref 3.8–10.5)

## 2022-05-26 PROCEDURE — 99213 OFFICE O/P EST LOW 20 MIN: CPT

## 2022-05-26 PROCEDURE — G0108 DIAB MANAGE TRN  PER INDIV: CPT | Mod: 95

## 2022-05-26 NOTE — HISTORY OF PRESENT ILLNESS
[de-identified] : 36 year old  Evangelical female currently at 20 weeks gestation with MARCIAL on 22. She will undergo elective  delivery, date unknown at present time. \par She states she did not have anemia as a baby or child. She denies menorrhagia including from teenage years. She denies being told by medical practitioner that she was anemic until several months after the delivery of her second child in  by . She states PCP informed her of anemia and started her on iron pills. She recalls that her hemoglobin went back up to normal range. She stopped the iron pills. During her routine yearly physical in , she was told she had iron deficiency anemia and she started iron pills. She states she has been taking Ferrous sulfate 325 mg po daily since , but her PCP has still told her that her "iron level" is low. She denies epistaxis, spontaneous bruising, menorrhagia, other active bleeding, including no melena, BRBPR. She denies personal history of thrombocytopenia or bleeding disorder. \par \par Outside Lab results(on patient's phone) from 17, during third pregnancy with MARCIAL in 2018- Hgb- 10.6, Hct- 32.5, MCV- 73, RDW- 19.5%; Platelets- 246,000. \par Labs from [facility] ED on 21- Hgb- 10.6, Hct- 34.7, MCV- 73. She was evaluated for dizziness and high glucose level\par 22- Hgb electrophoresis- normal pattern\par \par \par Transfusion history- Negative. \par The patient denies any event in the past where a physician recommended to her that she needed to have a transfusion. \par \par Family Medical History- no bleeding disorders \par She is not aware of family members having sickle cell disease or thalassemia. \par \par Past surgical history as documented- she denies excessive unexpected bleeding during or after surgery.  [de-identified] : 22:\par She increased Ferrous sulfate to twice daily around 22. She is currently at 26 weeks gestation with MARCIAL on 22. She will undergo elective  delivery, date unknown at present time. She has mild fatigue and good appetite. She denies bleeding, fever, shortness of breath. \par \par 22:\par Patient is currently at 30 weeks gestation with MARCIAL on 22. She will undergo elective  delivery, date unknown at present time. She has mild fatigue and good appetite. She denies bleeding, fever, shortness of breath at rest. She is being monitored by Cardiology, last visit on 22. She has been compliant taking Ferrous sulfate twice daily.

## 2022-05-26 NOTE — REASON FOR VISIT
[Follow-Up Visit] : a follow-up visit for [FreeTextEntry2] : anemia during pregnancy in third trimester in patient that refuses transfusion of blood products due to Yazdanism beliefs

## 2022-05-26 NOTE — ASSESSMENT
[FreeTextEntry1] : 1) Anemia-  She denies being told by medical practitioner that she was anemic until several months after the delivery of her second child in  by . She states PCP informed her of anemia and started her on iron pills. She recalls that her hemoglobin went back up to normal range. She stopped the iron pills. During her routine yearly physical in 2016, she was told she had iron deficiency anemia and she started iron pills. She states she has been taking Ferrous sulfate 325 mg po daily since 2016, but her PCP has still told her that her "iron level" is low. \par Plan- check iron levels to assess for iron deficiency anemia- Iron studies within normal range on 3/17/22\par Check Vitamin B-12 and Folate level to rule out deficiency state- Of note, levels within normal range on 3/17/22\par Continue Ferrous sulfate twice daily, Vitamin C once daily, and PNV daily\par Increase dietary intake of foods high in iron content\par I previously discussed with patient about treatment with JESSICA(Procrit) weekly, starting 4 weeks before delivery if her hemoglobin is decreasing prior to  delivery.\par \par 2) Refusal of blood transfusions as patient is Samaritan-\par I had a discussion with the patient regarding information on the Informed Consent for Blood Avoidance, Blood Refusal and Blood Management Form. The patient states that she Refuses transfusion of red blood cells, platelets, and fresh frozen plasma under any circumstances, even if health care providers believe that such are necessary to preserve her life. She refuses to pre-donate and store her blood for later infusion. \par \par She will accept medical treatment with minor blood fractions including Albumin, Erythropoietin, Fibrin Sealant, Human Immunoglobulin, RhoGAM, Cryoprecipitate, Humate-P, Prothrombin Complex Concentrate, FEIBA, if clinically indicated. She will accept medical treatment with Factor VII A, Factor VIII recombinant, Factor IX recombinant, Factor XIII recombinant, if clinically indicated. \par \par She will only accept the use of the Cell Saver where extracorporeal circulation is a closed system without blood storage. \par \par She has submitted a copy of her legally executed [place] health care proxy to her primary obstetrician and to High Risk Maternal Fetal Medicine Department. \par \par RTC in 1 month

## 2022-05-26 NOTE — REVIEW OF SYSTEMS
[Palpitations] : palpitations [Fever] : no fever [Nosebleeds] : no nosebleeds [Chest Pain] : no chest pain [Shortness Of Breath] : no shortness of breath [Cough] : no cough [Vomiting] : no vomiting [Diarrhea] : no diarrhea [Dysuria] : no dysuria [Dizziness] : no dizziness [Fainting] : no fainting [Easy Bleeding] : no tendency for easy bleeding [Easy Bruising] : no tendency for easy bruising [FreeTextEntry8] : no hematuria

## 2022-05-26 NOTE — PHYSICAL EXAM
[Normal] : affect appropriate [de-identified] : anicteric [de-identified] : RRR, normal S1S2 [de-identified] : no pitting edema [de-identified] : gravid, normoactive bowel sounds [de-identified] : no ecchymoses, petechiae [de-identified] : A & O x 4

## 2022-05-26 NOTE — CONSULT LETTER
[Dear  ___] : Dear  [unfilled], [Courtesy Letter:] : I had the pleasure of seeing your patient, [unfilled], in my office today. [Please see my note below.] : Please see my note below. [Consult Closing:] : Thank you very much for allowing me to participate in the care of this patient.  If you have any questions, please do not hesitate to contact me. [Sincerely,] : Sincerely, [DrTomasz  ___] : Dr. LARA [FreeTextEntry2] : Emilia Zavala MD\par [address]\par [place] [address]\par \par  [FreeTextEntry3] : \par Bhavna Ferrer M.D.\par \par  of Medicine\par [facility] [facility] [employer]\par [facility] \par [facility] [place]\par [address] [place] [address] \[place] [address] \par ph: [phone]\par fax: [phone]

## 2022-05-26 NOTE — RESULTS/DATA
[FreeTextEntry1] : (Today) WBC- 8.02 with normal differential; Hgb- 10.0, Hct- 31.8, MCV- 80.6; Platelets- 207,000\par \par (3/17/22)PBS- WBC- predominantly neutrophils, mature lymphocytes; RBC- HC MC, few target cells; Platelets- normal size and number

## 2022-06-02 ENCOUNTER — NON-APPOINTMENT (OUTPATIENT)
Age: 37
End: 2022-06-02

## 2022-06-02 ENCOUNTER — APPOINTMENT (OUTPATIENT)
Dept: OBGYN | Facility: CLINIC | Age: 37
End: 2022-06-02
Payer: COMMERCIAL

## 2022-06-02 ENCOUNTER — ASOB RESULT (OUTPATIENT)
Age: 37
End: 2022-06-02

## 2022-06-02 ENCOUNTER — APPOINTMENT (OUTPATIENT)
Dept: ANTEPARTUM | Facility: CLINIC | Age: 37
End: 2022-06-02
Payer: COMMERCIAL

## 2022-06-02 VITALS
HEIGHT: 69 IN | SYSTOLIC BLOOD PRESSURE: 124 MMHG | BODY MASS INDEX: 38.66 KG/M2 | DIASTOLIC BLOOD PRESSURE: 70 MMHG | WEIGHT: 261 LBS

## 2022-06-02 DIAGNOSIS — O09.521 SUPERVISION OF ELDERLY MULTIGRAVIDA, FIRST TRIMESTER: ICD-10-CM

## 2022-06-02 PROCEDURE — 76816 OB US FOLLOW-UP PER FETUS: CPT

## 2022-06-02 PROCEDURE — 0502F SUBSEQUENT PRENATAL CARE: CPT

## 2022-06-02 PROCEDURE — 76819 FETAL BIOPHYS PROFIL W/O NST: CPT

## 2022-06-02 PROCEDURE — 81003 URINALYSIS AUTO W/O SCOPE: CPT | Mod: NC,QW

## 2022-06-06 ENCOUNTER — ASOB RESULT (OUTPATIENT)
Age: 37
End: 2022-06-06

## 2022-06-06 ENCOUNTER — APPOINTMENT (OUTPATIENT)
Dept: MATERNAL FETAL MEDICINE | Facility: CLINIC | Age: 37
End: 2022-06-06
Payer: COMMERCIAL

## 2022-06-06 PROCEDURE — G0108 DIAB MANAGE TRN  PER INDIV: CPT | Mod: 95

## 2022-06-09 ENCOUNTER — NON-APPOINTMENT (OUTPATIENT)
Age: 37
End: 2022-06-09

## 2022-06-09 ENCOUNTER — APPOINTMENT (OUTPATIENT)
Dept: ANTEPARTUM | Facility: CLINIC | Age: 37
End: 2022-06-09
Payer: COMMERCIAL

## 2022-06-09 ENCOUNTER — ASOB RESULT (OUTPATIENT)
Age: 37
End: 2022-06-09

## 2022-06-09 PROCEDURE — 76818 FETAL BIOPHYS PROFILE W/NST: CPT

## 2022-06-13 ENCOUNTER — NON-APPOINTMENT (OUTPATIENT)
Age: 37
End: 2022-06-13

## 2022-06-14 ENCOUNTER — ASOB RESULT (OUTPATIENT)
Age: 37
End: 2022-06-14

## 2022-06-14 ENCOUNTER — APPOINTMENT (OUTPATIENT)
Dept: ANTEPARTUM | Facility: CLINIC | Age: 37
End: 2022-06-14
Payer: COMMERCIAL

## 2022-06-14 PROCEDURE — 59025 FETAL NON-STRESS TEST: CPT

## 2022-06-17 ENCOUNTER — NON-APPOINTMENT (OUTPATIENT)
Age: 37
End: 2022-06-17

## 2022-06-17 ENCOUNTER — APPOINTMENT (OUTPATIENT)
Dept: ANTEPARTUM | Facility: CLINIC | Age: 37
End: 2022-06-17
Payer: COMMERCIAL

## 2022-06-17 ENCOUNTER — APPOINTMENT (OUTPATIENT)
Dept: ANTEPARTUM | Facility: CLINIC | Age: 37
End: 2022-06-17

## 2022-06-17 ENCOUNTER — ASOB RESULT (OUTPATIENT)
Age: 37
End: 2022-06-17

## 2022-06-17 PROCEDURE — 76818 FETAL BIOPHYS PROFILE W/NST: CPT

## 2022-06-20 RX ORDER — INSULIN HUMAN 100 [IU]/ML
100 INJECTION, SUSPENSION SUBCUTANEOUS
Qty: 1 | Refills: 2 | Status: ACTIVE | COMMUNITY
Start: 2022-01-11 | End: 1900-01-01

## 2022-06-21 ENCOUNTER — APPOINTMENT (OUTPATIENT)
Dept: ANTEPARTUM | Facility: CLINIC | Age: 37
End: 2022-06-21
Payer: COMMERCIAL

## 2022-06-21 ENCOUNTER — ASOB RESULT (OUTPATIENT)
Age: 37
End: 2022-06-21

## 2022-06-21 PROCEDURE — 59025 FETAL NON-STRESS TEST: CPT

## 2022-06-23 ENCOUNTER — NON-APPOINTMENT (OUTPATIENT)
Age: 37
End: 2022-06-23

## 2022-06-23 ENCOUNTER — APPOINTMENT (OUTPATIENT)
Dept: MATERNAL FETAL MEDICINE | Facility: CLINIC | Age: 37
End: 2022-06-23
Payer: COMMERCIAL

## 2022-06-23 ENCOUNTER — ASOB RESULT (OUTPATIENT)
Age: 37
End: 2022-06-23

## 2022-06-23 ENCOUNTER — APPOINTMENT (OUTPATIENT)
Dept: OBGYN | Facility: CLINIC | Age: 37
End: 2022-06-23

## 2022-06-23 ENCOUNTER — APPOINTMENT (OUTPATIENT)
Dept: ANTEPARTUM | Facility: CLINIC | Age: 37
End: 2022-06-23
Payer: COMMERCIAL

## 2022-06-23 VITALS — HEIGHT: 69 IN | SYSTOLIC BLOOD PRESSURE: 116 MMHG | DIASTOLIC BLOOD PRESSURE: 77 MMHG

## 2022-06-23 PROCEDURE — 90471 IMMUNIZATION ADMIN: CPT

## 2022-06-23 PROCEDURE — 0502F SUBSEQUENT PRENATAL CARE: CPT

## 2022-06-23 PROCEDURE — G0108 DIAB MANAGE TRN  PER INDIV: CPT | Mod: 95

## 2022-06-23 PROCEDURE — 76818 FETAL BIOPHYS PROFILE W/NST: CPT

## 2022-06-23 PROCEDURE — 90715 TDAP VACCINE 7 YRS/> IM: CPT

## 2022-06-23 PROCEDURE — 81003 URINALYSIS AUTO W/O SCOPE: CPT | Mod: NC,QW

## 2022-06-28 ENCOUNTER — ASOB RESULT (OUTPATIENT)
Age: 37
End: 2022-06-28

## 2022-06-28 ENCOUNTER — NON-APPOINTMENT (OUTPATIENT)
Age: 37
End: 2022-06-28

## 2022-06-28 ENCOUNTER — APPOINTMENT (OUTPATIENT)
Dept: ANTEPARTUM | Facility: CLINIC | Age: 37
End: 2022-06-28

## 2022-06-30 ENCOUNTER — NON-APPOINTMENT (OUTPATIENT)
Age: 37
End: 2022-06-30

## 2022-06-30 ENCOUNTER — APPOINTMENT (OUTPATIENT)
Dept: ANTEPARTUM | Facility: CLINIC | Age: 37
End: 2022-06-30

## 2022-06-30 ENCOUNTER — RESULT REVIEW (OUTPATIENT)
Age: 37
End: 2022-06-30

## 2022-06-30 ENCOUNTER — APPOINTMENT (OUTPATIENT)
Dept: HEMATOLOGY ONCOLOGY | Facility: CLINIC | Age: 37
End: 2022-06-30

## 2022-06-30 ENCOUNTER — ASOB RESULT (OUTPATIENT)
Age: 37
End: 2022-06-30

## 2022-06-30 VITALS
BODY MASS INDEX: 39.03 KG/M2 | RESPIRATION RATE: 16 BRPM | TEMPERATURE: 96.9 F | WEIGHT: 264.33 LBS | SYSTOLIC BLOOD PRESSURE: 115 MMHG | HEART RATE: 75 BPM | DIASTOLIC BLOOD PRESSURE: 75 MMHG | OXYGEN SATURATION: 99 %

## 2022-06-30 DIAGNOSIS — Z53.1 PROCEDURE AND TREATMENT NOT CARRIED OUT BECAUSE OF PATIENT'S DECISION FOR REASONS OF BELIEF AND GROUP PRESSURE: ICD-10-CM

## 2022-06-30 LAB
BASOPHILS # BLD AUTO: 0.03 K/UL — SIGNIFICANT CHANGE UP (ref 0–0.2)
BASOPHILS NFR BLD AUTO: 0.3 % — SIGNIFICANT CHANGE UP (ref 0–2)
EOSINOPHIL # BLD AUTO: 0.15 K/UL — SIGNIFICANT CHANGE UP (ref 0–0.5)
EOSINOPHIL NFR BLD AUTO: 1.7 % — SIGNIFICANT CHANGE UP (ref 0–6)
HCT VFR BLD CALC: 31.8 % — LOW (ref 34.5–45)
HGB BLD-MCNC: 10.1 G/DL — LOW (ref 11.5–15.5)
IMM GRANULOCYTES NFR BLD AUTO: 0.5 % — SIGNIFICANT CHANGE UP (ref 0–1.5)
LYMPHOCYTES # BLD AUTO: 2 K/UL — SIGNIFICANT CHANGE UP (ref 1–3.3)
LYMPHOCYTES # BLD AUTO: 22.8 % — SIGNIFICANT CHANGE UP (ref 13–44)
MCHC RBC-ENTMCNC: 25.3 PG — LOW (ref 27–34)
MCHC RBC-ENTMCNC: 31.8 G/DL — LOW (ref 32–36)
MCV RBC AUTO: 79.5 FL — LOW (ref 80–100)
MONOCYTES # BLD AUTO: 0.56 K/UL — SIGNIFICANT CHANGE UP (ref 0–0.9)
MONOCYTES NFR BLD AUTO: 6.4 % — SIGNIFICANT CHANGE UP (ref 2–14)
NEUTROPHILS # BLD AUTO: 6 K/UL — SIGNIFICANT CHANGE UP (ref 1.8–7.4)
NEUTROPHILS NFR BLD AUTO: 68.3 % — SIGNIFICANT CHANGE UP (ref 43–77)
NRBC # BLD: 0 /100 WBCS — SIGNIFICANT CHANGE UP (ref 0–0)
PLATELET # BLD AUTO: 200 K/UL — SIGNIFICANT CHANGE UP (ref 150–400)
RBC # BLD: 4 M/UL — SIGNIFICANT CHANGE UP (ref 3.8–5.2)
RBC # FLD: 15 % — HIGH (ref 10.3–14.5)
WBC # BLD: 8.78 K/UL — SIGNIFICANT CHANGE UP (ref 3.8–10.5)
WBC # FLD AUTO: 8.78 K/UL — SIGNIFICANT CHANGE UP (ref 3.8–10.5)

## 2022-06-30 PROCEDURE — 76816 OB US FOLLOW-UP PER FETUS: CPT

## 2022-06-30 PROCEDURE — 76818 FETAL BIOPHYS PROFILE W/NST: CPT

## 2022-06-30 PROCEDURE — 99214 OFFICE O/P EST MOD 30 MIN: CPT

## 2022-06-30 RX ORDER — INSULIN GLARGINE 100 [IU]/ML
100 INJECTION, SOLUTION SUBCUTANEOUS
Qty: 9 | Refills: 0 | Status: ACTIVE | COMMUNITY
Start: 2022-01-12

## 2022-06-30 RX ORDER — PEN NEEDLE, DIABETIC 29 G X1/2"
31G X 8 MM NEEDLE, DISPOSABLE MISCELLANEOUS
Qty: 100 | Refills: 0 | Status: ACTIVE | COMMUNITY
Start: 2022-01-13

## 2022-07-01 ENCOUNTER — APPOINTMENT (OUTPATIENT)
Dept: CARDIOLOGY | Facility: CLINIC | Age: 37
End: 2022-07-01

## 2022-07-05 ENCOUNTER — OUTPATIENT (OUTPATIENT)
Dept: OUTPATIENT SERVICES | Facility: HOSPITAL | Age: 37
LOS: 1 days | End: 2022-07-05
Payer: COMMERCIAL

## 2022-07-05 ENCOUNTER — APPOINTMENT (OUTPATIENT)
Dept: MATERNAL FETAL MEDICINE | Facility: CLINIC | Age: 37
End: 2022-07-05

## 2022-07-05 ENCOUNTER — APPOINTMENT (OUTPATIENT)
Dept: ANTEPARTUM | Facility: CLINIC | Age: 37
End: 2022-07-05

## 2022-07-05 ENCOUNTER — ASOB RESULT (OUTPATIENT)
Age: 37
End: 2022-07-05

## 2022-07-05 VITALS
OXYGEN SATURATION: 99 % | RESPIRATION RATE: 16 BRPM | WEIGHT: 262.35 LBS | TEMPERATURE: 98 F | DIASTOLIC BLOOD PRESSURE: 75 MMHG | HEART RATE: 93 BPM | HEIGHT: 69 IN | SYSTOLIC BLOOD PRESSURE: 109 MMHG

## 2022-07-05 DIAGNOSIS — Z98.891 HISTORY OF UTERINE SCAR FROM PREVIOUS SURGERY: ICD-10-CM

## 2022-07-05 DIAGNOSIS — O09.523 SUPERVISION OF ELDERLY MULTIGRAVIDA, THIRD TRIMESTER: ICD-10-CM

## 2022-07-05 DIAGNOSIS — E11.9 TYPE 2 DIABETES MELLITUS WITHOUT COMPLICATIONS: ICD-10-CM

## 2022-07-05 DIAGNOSIS — Z98.891 HISTORY OF UTERINE SCAR FROM PREVIOUS SURGERY: Chronic | ICD-10-CM

## 2022-07-05 DIAGNOSIS — Z53.1 PROCEDURE AND TREATMENT NOT CARRIED OUT BECAUSE OF PATIENT'S DECISION FOR REASONS OF BELIEF AND GROUP PRESSURE: ICD-10-CM

## 2022-07-05 DIAGNOSIS — Z01.818 ENCOUNTER FOR OTHER PREPROCEDURAL EXAMINATION: ICD-10-CM

## 2022-07-05 DIAGNOSIS — Z98.890 OTHER SPECIFIED POSTPROCEDURAL STATES: Chronic | ICD-10-CM

## 2022-07-05 DIAGNOSIS — Z91.89 OTHER SPECIFIED PERSONAL RISK FACTORS, NOT ELSEWHERE CLASSIFIED: ICD-10-CM

## 2022-07-05 DIAGNOSIS — Z29.9 ENCOUNTER FOR PROPHYLACTIC MEASURES, UNSPECIFIED: ICD-10-CM

## 2022-07-05 DIAGNOSIS — O10.013 PRE-EXISTING ESSENTIAL HYPERTENSION COMPLICATING PREGNANCY, THIRD TRIMESTER: ICD-10-CM

## 2022-07-05 LAB
A1C WITH ESTIMATED AVERAGE GLUCOSE RESULT: 6.8 % — HIGH (ref 4–5.6)
ALBUMIN SERPL ELPH-MCNC: 3.7 G/DL
ALP BLD-CCNC: 138 U/L
ALT SERPL-CCNC: 22 U/L
ANION GAP SERPL CALC-SCNC: 10 MMOL/L
ANION GAP SERPL CALC-SCNC: 13 MMOL/L — SIGNIFICANT CHANGE UP (ref 5–17)
AST SERPL-CCNC: 21 U/L
BILIRUB SERPL-MCNC: 0.2 MG/DL
BUN SERPL-MCNC: 7 MG/DL — SIGNIFICANT CHANGE UP (ref 7–23)
BUN SERPL-MCNC: 8 MG/DL
CALCIUM SERPL-MCNC: 9.6 MG/DL
CALCIUM SERPL-MCNC: 9.9 MG/DL — SIGNIFICANT CHANGE UP (ref 8.4–10.5)
CHLORIDE SERPL-SCNC: 102 MMOL/L — SIGNIFICANT CHANGE UP (ref 96–108)
CHLORIDE SERPL-SCNC: 105 MMOL/L
CO2 SERPL-SCNC: 18 MMOL/L — LOW (ref 22–31)
CO2 SERPL-SCNC: 21 MMOL/L
CREAT SERPL-MCNC: 0.39 MG/DL — LOW (ref 0.5–1.3)
CREAT SERPL-MCNC: 0.44 MG/DL
EGFR: 128 ML/MIN/1.73M2
EGFR: 131 ML/MIN/1.73M2 — SIGNIFICANT CHANGE UP
ESTIMATED AVERAGE GLUCOSE: 148 MG/DL — HIGH (ref 68–114)
FERRITIN SERPL-MCNC: 30 NG/ML
FERRITIN SERPL-MCNC: 44 NG/ML
GLUCOSE SERPL-MCNC: 131 MG/DL — HIGH (ref 70–99)
GLUCOSE SERPL-MCNC: 77 MG/DL
HCT VFR BLD CALC: 31.9 % — LOW (ref 34.5–45)
HGB BLD-MCNC: 10.3 G/DL — LOW (ref 11.5–15.5)
IRON SATN MFR SERPL: 11 %
IRON SATN MFR SERPL: 12 %
IRON SERPL-MCNC: 47 UG/DL
IRON SERPL-MCNC: 48 UG/DL
MCHC RBC-ENTMCNC: 25.2 PG — LOW (ref 27–34)
MCHC RBC-ENTMCNC: 32.3 GM/DL — SIGNIFICANT CHANGE UP (ref 32–36)
MCV RBC AUTO: 78.2 FL — LOW (ref 80–100)
NRBC # BLD: 0 /100 WBCS — SIGNIFICANT CHANGE UP (ref 0–0)
PLATELET # BLD AUTO: 220 K/UL — SIGNIFICANT CHANGE UP (ref 150–400)
POTASSIUM SERPL-MCNC: 3.8 MMOL/L — SIGNIFICANT CHANGE UP (ref 3.5–5.3)
POTASSIUM SERPL-SCNC: 3.8 MMOL/L — SIGNIFICANT CHANGE UP (ref 3.5–5.3)
POTASSIUM SERPL-SCNC: 4 MMOL/L
PROT SERPL-MCNC: 6.6 G/DL
RBC # BLD: 4.08 M/UL — SIGNIFICANT CHANGE UP (ref 3.8–5.2)
RBC # FLD: 15.2 % — HIGH (ref 10.3–14.5)
SODIUM SERPL-SCNC: 133 MMOL/L — LOW (ref 135–145)
SODIUM SERPL-SCNC: 136 MMOL/L
TIBC SERPL-MCNC: 409 UG/DL
TIBC SERPL-MCNC: 417 UG/DL
UIBC SERPL-MCNC: 361 UG/DL
UIBC SERPL-MCNC: 370 UG/DL
WBC # BLD: 6.81 K/UL — SIGNIFICANT CHANGE UP (ref 3.8–10.5)
WBC # FLD AUTO: 6.81 K/UL — SIGNIFICANT CHANGE UP (ref 3.8–10.5)

## 2022-07-05 PROCEDURE — 76818 FETAL BIOPHYS PROFILE W/NST: CPT

## 2022-07-05 PROCEDURE — 80048 BASIC METABOLIC PNL TOTAL CA: CPT

## 2022-07-05 PROCEDURE — G0463: CPT

## 2022-07-05 PROCEDURE — G0108 DIAB MANAGE TRN  PER INDIV: CPT

## 2022-07-05 PROCEDURE — 83036 HEMOGLOBIN GLYCOSYLATED A1C: CPT

## 2022-07-05 PROCEDURE — 85027 COMPLETE CBC AUTOMATED: CPT

## 2022-07-05 RX ORDER — FERROUS SULFATE 325(65) MG
1 TABLET ORAL
Qty: 0 | Refills: 0 | DISCHARGE

## 2022-07-05 RX ORDER — SEMAGLUTIDE 0.68 MG/ML
0 INJECTION, SOLUTION SUBCUTANEOUS
Qty: 0 | Refills: 0 | DISCHARGE

## 2022-07-05 RX ORDER — METFORMIN HYDROCHLORIDE 850 MG/1
1 TABLET ORAL
Qty: 0 | Refills: 0 | DISCHARGE

## 2022-07-05 RX ORDER — OXYTOCIN 10 UNIT/ML
333.33 VIAL (ML) INJECTION
Qty: 20 | Refills: 0 | Status: DISCONTINUED | OUTPATIENT
Start: 2022-07-15 | End: 2022-07-18

## 2022-07-05 RX ORDER — SODIUM CHLORIDE 9 MG/ML
1000 INJECTION, SOLUTION INTRAVENOUS ONCE
Refills: 0 | Status: DISCONTINUED | OUTPATIENT
Start: 2022-07-15 | End: 2022-07-15

## 2022-07-05 RX ORDER — FERROUS SULFATE 325(65) MG
0 TABLET ORAL
Qty: 0 | Refills: 0 | DISCHARGE

## 2022-07-05 RX ORDER — LABETALOL HCL 100 MG
1 TABLET ORAL
Qty: 0 | Refills: 0 | DISCHARGE

## 2022-07-05 RX ORDER — SODIUM CHLORIDE 9 MG/ML
1000 INJECTION, SOLUTION INTRAVENOUS
Refills: 0 | Status: DISCONTINUED | OUTPATIENT
Start: 2022-07-15 | End: 2022-07-15

## 2022-07-05 RX ORDER — TRAMADOL HYDROCHLORIDE 50 MG/1
1 TABLET ORAL
Qty: 0 | Refills: 0 | DISCHARGE

## 2022-07-05 NOTE — OB PST NOTE - NSICDXPASTSURGICALHX_GEN_ALL_CORE_FT
PAST SURGICAL HISTORY:  H/O arthroscopy 3/8/2019 - left hip- for labral tear, which has since recurred    H/O  section

## 2022-07-05 NOTE — OB PST NOTE - ACTIVITY
ADLs, walks 1-2 blocks, light housework ADLs, walks 1-2 blocks, light housework; activity limited by advanced pregnancy and low back/hip pain

## 2022-07-05 NOTE — OB PST NOTE - NSANTHOSAYNRD_GEN_A_CORE
No. JANE screening performed.  STOP BANG Legend: 0-2 = LOW Risk; 3-4 = INTERMEDIATE Risk; 5-8 = HIGH Risk neck = 16.25 inches/No. JANE screening performed.  STOP BANG Legend: 0-2 = LOW Risk; 3-4 = INTERMEDIATE Risk; 5-8 = HIGH Risk

## 2022-07-05 NOTE — OB PST NOTE - ASSESSMENT
History of  Section  Refusal of Blood Products as Patient is Uatsdin  Type 2 DM  At Risk for Sleep Apnea  TYRONEI VTE 2.0 SCORE [CLOT updated 2019]    AGE RELATED RISK FACTORS                                                       MOBILITY RELATED FACTORS  [ ] Age 41-60 years                                            (1 Point)                    [ ] Bed rest                                                        (1 Point)  [ ] Age: 61-74 years                                           (2 Points)                  [ ] Plaster cast                                                   (2 Points)  [ ] Age= 75 years                                              (3 Points)                    [ ] Bed bound for more than 72 hours                 (2 Points)    DISEASE RELATED RISK FACTORS                                               GENDER SPECIFIC FACTORS  [ ] Edema in the lower extremities                       (1 Point)              x] Pregnancy                                                     (1 Point)  [ ] Varicose veins                                               (1 Point)                     [ ] Post-partum < 6 weeks                                   (1 Point)             [x ] BMI > 25 Kg/m2                                            (1 Point)                     [ ] Hormonal therapy  or oral contraception          (1 Point)                 [ ] Sepsis (in the previous month)                        (1 Point)               [ ] History of pregnancy complications                 (1 point)  [ ] Pneumonia or serious lung disease                                               [ ] Unexplained or recurrent                     (1 Point)           (in the previous month)                               (1 Point)  [ ] Abnormal pulmonary function test                     (1 Point)                 SURGERY RELATED RISK FACTORS  [ ] Acute myocardial infarction                              (1 Point)               [ ]  Section                                             (1 Point)  [ ] Congestive heart failure (in the previous month)  (1 Point)      [ ] Minor surgery                                                  (1 Point)   [ ] Inflammatory bowel disease                             (1 Point)               [ ] Arthroscopic surgery                                        (2 Points)  [ ] Central venous access                                      (2 Points)                [ x] General surgery lasting more than 45 minutes (2 points)  [ ] Malignancy- Present or previous                   (2 Points)                [ ] Elective arthroplasty                                         (5 points)    [ ] Stroke (in the previous month)                          (5 Points)                                                                                                                                                           HEMATOLOGY RELATED FACTORS                                                 TRAUMA RELATED RISK FACTORS  [ ] Prior episodes of VTE                                     (3 Points)                [ ] Fracture of the hip, pelvis, or leg                       (5 Points)  [ ] Positive family history for VTE                         (3 Points)             [ ] Acute spinal cord injury (in the previous month)  (5 Points)  [ ] Prothrombin 43375 A                                     (3 Points)               [ ] Paralysis  (less than 1 month)                             (5 Points)  [ ] Factor V Leiden                                             (3 Points)                  [ ] Multiple Trauma within 1 month                        (5 Points)  [ ] Lupus anticoagulants                                     (3 Points)                                                           [ ] Anticardiolipin antibodies                               (3 Points)                                                       [ ] High homocysteine in the blood                      (3 Points)                                             [ ] Other congenital or acquired thrombophilia      (3 Points)                                                [ ] Heparin induced thrombocytopenia                  (3 Points)                                     Total Score [    4    ]

## 2022-07-05 NOTE — OB PST NOTE - PROBLEM SELECTOR PLAN 1
Repeat  Section  Anesthesia During Labor and Delivery sheet given to patient. Patient instructed to scan QR code and review instructions prior to coming to hospital for  Section.  Pt will f/u with OB/MFM as to whether she will hold aspirin preop

## 2022-07-05 NOTE — OB PST NOTE - NS_OBGYNHISTORY_OBGYN_ALL_OB_FT
GYN - small uterine fibroid noted during current pregnancy  OB History - GYN - uterine fibroid noted during current pregnancy  OB History -  x 2 (2006 and 10/1/2009); C Section 2018 primary c section due to footling breech 35 weeks 4 days

## 2022-07-05 NOTE — OB PST NOTE - PROBLEM SELECTOR PLAN 2
Anesthesia Consult (4/21/22) and Heme Consult (Dr Ferrer 6/30/22) with completed Informed Consent For Blood Avoidance, Blood Refusal and Blood Management on chart. She refuses whole elements of blood, but consents to Category 1 Minor Blood Fractions, Category II Synthetic Protein Elements of Blood, Category III and Category IV Cell Saver (per Heme note only where extracorporeal circulation is a closed system without blood storage).  Copy of Anesthesia and Heme Consults on chart  Discussed case with Dr Bryant - pt does not require additional Anesthesia consult today as forms/Heme Consult are completed and pt does not require T&S to be drawn at PST

## 2022-07-05 NOTE — OB PST NOTE - HISTORY OF PRESENT ILLNESS
37 yo  with h/o HTN, Type 2 DM (on insulin and metformin) and anemia(on iron supplementation) at 35 weeks and 5 days' gestation is scheduled for Repeat  Section on 22. Pt is Baptist and has had Anesthesia Consult (22) and Heme Consult (Dr Ferrer 22) with completed Informed Consent For Blood Avoidance, Blood Refusal and Blood Management on chart. She refuses whole elements of blood, but consents to Category 1 Minor Blood Fractions, Category II Synthetic Protein Elements of Blood, Category III and Category IV Cell Saver (per Heme note only where extracorporeal circulation is a closed system without blood storage).    Pt is vaccinated x 2 against Covid-19 and had a mild case of Covid-19 2021, which did not require hospitalization or supplemental oxygen. Pt is scheduled for Covid-19 PCR on 22 at [facility].

## 2022-07-05 NOTE — OB PST NOTE - FALL HARM RISK - HARM RISK INTERVENTIONS

## 2022-07-05 NOTE — OB PST NOTE - NSICDXPASTMEDICALHX_GEN_ALL_CORE_FT
PAST MEDICAL HISTORY:  Anemia     Chronic back pain     COVID-19 12/2021 - mild case, did not require hospitalization or supplemental oxygen    DM (diabetes mellitus) Type 2 DM    HTN (hypertension)     Labral tear of hip joint left hip    Lumbar disc herniation     Refusal of blood transfusions as patient is Restorationist      PAST MEDICAL HISTORY:  Anemia     Chronic back pain     COVID-19 12/2021 - mild case, did not require hospitalization or supplemental oxygen    DM (diabetes mellitus) Type 2 DM    HTN (hypertension)     Labral tear of hip joint left hip    Lumbar disc herniation h/o MARY    Refusal of blood transfusions as patient is Mandaeism

## 2022-07-06 PROBLEM — M51.26 OTHER INTERVERTEBRAL DISC DISPLACEMENT, LUMBAR REGION: Chronic | Status: ACTIVE | Noted: 2022-07-05

## 2022-07-06 PROBLEM — E11.9 TYPE 2 DIABETES MELLITUS WITHOUT COMPLICATIONS: Chronic | Status: ACTIVE | Noted: 2019-12-12

## 2022-07-06 PROBLEM — U07.1 COVID-19: Chronic | Status: ACTIVE | Noted: 2022-07-05

## 2022-07-06 PROBLEM — D64.9 ANEMIA, UNSPECIFIED: Chronic | Status: ACTIVE | Noted: 2022-07-05

## 2022-07-06 PROBLEM — S73.199A OTHER SPRAIN OF UNSPECIFIED HIP, INITIAL ENCOUNTER: Chronic | Status: ACTIVE | Noted: 2022-07-05

## 2022-07-06 PROBLEM — Z53.1 PROCEDURE AND TREATMENT NOT CARRIED OUT BECAUSE OF PATIENT'S DECISION FOR REASONS OF BELIEF AND GROUP PRESSURE: Chronic | Status: ACTIVE | Noted: 2022-07-05

## 2022-07-07 ENCOUNTER — APPOINTMENT (OUTPATIENT)
Dept: ANTEPARTUM | Facility: CLINIC | Age: 37
End: 2022-07-07

## 2022-07-07 ENCOUNTER — NON-APPOINTMENT (OUTPATIENT)
Age: 37
End: 2022-07-07

## 2022-07-07 ENCOUNTER — APPOINTMENT (OUTPATIENT)
Dept: OBGYN | Facility: CLINIC | Age: 37
End: 2022-07-07

## 2022-07-07 ENCOUNTER — ASOB RESULT (OUTPATIENT)
Age: 37
End: 2022-07-07

## 2022-07-07 VITALS — SYSTOLIC BLOOD PRESSURE: 122 MMHG | DIASTOLIC BLOOD PRESSURE: 68 MMHG

## 2022-07-07 PROCEDURE — 76818 FETAL BIOPHYS PROFILE W/NST: CPT

## 2022-07-07 PROCEDURE — 0502F SUBSEQUENT PRENATAL CARE: CPT

## 2022-07-08 ENCOUNTER — ASOB RESULT (OUTPATIENT)
Age: 37
End: 2022-07-08

## 2022-07-08 ENCOUNTER — APPOINTMENT (OUTPATIENT)
Dept: ANTEPARTUM | Facility: CLINIC | Age: 37
End: 2022-07-08

## 2022-07-08 ENCOUNTER — APPOINTMENT (OUTPATIENT)
Dept: INFUSION THERAPY | Facility: HOSPITAL | Age: 37
End: 2022-07-08

## 2022-07-08 ENCOUNTER — NON-APPOINTMENT (OUTPATIENT)
Age: 37
End: 2022-07-08

## 2022-07-08 DIAGNOSIS — D50.9 IRON DEFICIENCY ANEMIA, UNSPECIFIED: ICD-10-CM

## 2022-07-08 PROCEDURE — 76818 FETAL BIOPHYS PROFILE W/NST: CPT

## 2022-07-08 NOTE — RESULTS/DATA
[FreeTextEntry1] : (Today) WBC- 8.78 with normal differential; Hgb- 10.1, Hct- 31.8; Platelets- 200,000\par \par (3/17/22)PBS- WBC- predominantly neutrophils, mature lymphocytes; RBC- HC MC, few target cells; Platelets- normal size and number

## 2022-07-08 NOTE — REASON FOR VISIT
[Follow-Up Visit] : a follow-up visit for [FreeTextEntry2] : anemia during pregnancy in third trimester in patient that refuses transfusion of blood products due to Orthodoxy beliefs

## 2022-07-08 NOTE — CONSULT LETTER
[Dear  ___] : Dear  [unfilled], [Courtesy Letter:] : I had the pleasure of seeing your patient, [unfilled], in my office today. [Please see my note below.] : Please see my note below. [Consult Closing:] : Thank you very much for allowing me to participate in the care of this patient.  If you have any questions, please do not hesitate to contact me. [Sincerely,] : Sincerely, [DrTomasz  ___] : Dr. LARA [FreeTextEntry2] : Emilia Zavala MD\par [address]\par [address]\par [place] [address]\par \par  [FreeTextEntry3] : \par Bhavna Ferrer M.D.\par \par  of Medicine\par [employer] [employer]\par [facility] \par [facility]\par [address] [place] [address] \[place] [address] \par ph: [phone]\par fax: [phone]

## 2022-07-08 NOTE — ASSESSMENT
[FreeTextEntry1] : 1) Anemia-  She denies being told by medical practitioner that she was anemic until several months after the delivery of her second child in  by . She states PCP informed her of anemia and started her on iron pills. She recalls that her hemoglobin went back up to normal range. She stopped the iron pills. During her routine yearly physical in 2016, she was told she had iron deficiency anemia and she started iron pills. She states she has been taking Ferrous sulfate 325 mg po daily since 2016, but her PCP has still told her that her "iron level" is low. \par Plan- check iron levels to assess for iron deficiency anemia- Iron studies within normal range on 3/17/22\par Check Vitamin B-12 and Folate level to rule out deficiency state- Of note, levels within normal range on 3/17/22\par Continue Ferrous sulfate twice daily, Vitamin C once daily, and PNV daily\par Increase dietary intake of foods high in iron content\par I previously discussed with patient about treatment with JESSICA(Procrit) weekly, starting 4 weeks before delivery if her hemoglobin is decreasing prior to  delivery.\par 22:\par Hemoglobin is stable at 10.1 mg/dL. Repeat iron studies today. \par Increase Ferrous sulfate to 3X daily; continue to eat foods high in iron content. \par Cont Vitamin C once daily, and PNV daily\par If iron studies are below normal range, I discussed IV iron replacement therapy with patient. \par No indication for Procrit at this time. \par \par 2) Refusal of blood transfusions as patient is Hindu-\par I had a discussion with the patient regarding information on the Informed Consent for Blood Avoidance, Blood Refusal and Blood Management Form. The patient states that she Refuses transfusion of red blood cells, platelets, and fresh frozen plasma under any circumstances, even if health care providers believe that such are necessary to preserve her life. She refuses to pre-donate and store her blood for later infusion. \par \par She will accept medical treatment with minor blood fractions including Albumin, Erythropoietin, Fibrin Sealant, Human Immunoglobulin, RhoGAM, Cryoprecipitate, Humate-P, Prothrombin Complex Concentrate, FEIBA, if clinically indicated. She will accept medical treatment with Factor VII A, Factor VIII recombinant, Factor IX recombinant, Factor XIII recombinant, if clinically indicated. \par \par She will only accept the use of the Cell Saver where extracorporeal circulation is a closed system without blood storage. \par \par She has submitted a copy of her legally executed [place] health care proxy to her primary obstetrician and to High Risk Maternal Fetal Medicine Department. \par \par

## 2022-07-08 NOTE — PHYSICAL EXAM
[Normal] : affect appropriate [de-identified] : anicteric [de-identified] : RRR, normal S1S2 [de-identified] : no pitting edema [de-identified] : gravid, normoactive bowel sounds [de-identified] : no ecchymoses, petechiae [de-identified] : A & O x 4

## 2022-07-08 NOTE — HISTORY OF PRESENT ILLNESS
[de-identified] : 36 year old  Congregational female currently at 20 weeks gestation with MARCIAL on 22. She will undergo elective  delivery, date unknown at present time. \par She states she did not have anemia as a baby or child. She denies menorrhagia including from teenage years. She denies being told by medical practitioner that she was anemic until several months after the delivery of her second child in  by . She states PCP informed her of anemia and started her on iron pills. She recalls that her hemoglobin went back up to normal range. She stopped the iron pills. During her routine yearly physical in , she was told she had iron deficiency anemia and she started iron pills. She states she has been taking Ferrous sulfate 325 mg po daily since , but her PCP has still told her that her "iron level" is low. She denies epistaxis, spontaneous bruising, menorrhagia, other active bleeding, including no melena, BRBPR. She denies personal history of thrombocytopenia or bleeding disorder. \par \par Outside Lab results(on patient's phone) from 17, during third pregnancy with MARCIAL in 2018- Hgb- 10.6, Hct- 32.5, MCV- 73, RDW- 19.5%; Platelets- 246,000. \par Labs from [facility] ED on 21- Hgb- 10.6, Hct- 34.7, MCV- 73. She was evaluated for dizziness and high glucose level\par 22- Hgb electrophoresis- normal pattern\par \par \par Transfusion history- Negative. \par The patient denies any event in the past where a physician recommended to her that she needed to have a transfusion. \par \par Family Medical History- no bleeding disorders \par She is not aware of family members having sickle cell disease or thalassemia. \par \par Past surgical history as documented- she denies excessive unexpected bleeding during or after surgery.  [de-identified] : 22:\par She increased Ferrous sulfate to twice daily around 22. She is currently at 26 weeks gestation with MARCIAL on 22. She will undergo elective  delivery, date unknown at present time. She has mild fatigue and good appetite. She denies bleeding, fever, shortness of breath. \par \par 22:\par Patient is currently at 30 weeks gestation with MARCIAL on 22. She will undergo elective  delivery, date unknown at present time. She has mild fatigue and good appetite. She denies bleeding, fever, shortness of breath at rest. She is being monitored by Cardiology, last visit on 22. She has been compliant taking Ferrous sulfate twice daily. \par \par 22:\par Patient is currently at 35 weeks gestation with MARCIAL on 22. She will undergo elective  delivery, on 22. She has mild fatigue and good appetite. She denies bleeding, fever, shortness of breath at rest. She is being monitored by Cardiology, last visit on 22. She has been compliant taking Ferrous sulfate twice daily.

## 2022-07-11 ENCOUNTER — APPOINTMENT (OUTPATIENT)
Dept: INFUSION THERAPY | Facility: HOSPITAL | Age: 37
End: 2022-07-11

## 2022-07-11 ENCOUNTER — NON-APPOINTMENT (OUTPATIENT)
Age: 37
End: 2022-07-11

## 2022-07-11 ENCOUNTER — ASOB RESULT (OUTPATIENT)
Age: 37
End: 2022-07-11

## 2022-07-11 ENCOUNTER — APPOINTMENT (OUTPATIENT)
Dept: ANTEPARTUM | Facility: CLINIC | Age: 37
End: 2022-07-11

## 2022-07-11 LAB
GP B STREP DNA SPEC QL NAA+PROBE: NORMAL
GP B STREP DNA SPEC QL NAA+PROBE: NOT DETECTED
SOURCE GBS: NORMAL

## 2022-07-11 PROCEDURE — 76818 FETAL BIOPHYS PROFILE W/NST: CPT

## 2022-07-13 ENCOUNTER — OUTPATIENT (OUTPATIENT)
Dept: OUTPATIENT SERVICES | Facility: HOSPITAL | Age: 37
LOS: 1 days | End: 2022-07-13
Payer: COMMERCIAL

## 2022-07-13 ENCOUNTER — NON-APPOINTMENT (OUTPATIENT)
Age: 37
End: 2022-07-13

## 2022-07-13 ENCOUNTER — APPOINTMENT (OUTPATIENT)
Dept: INFUSION THERAPY | Facility: HOSPITAL | Age: 37
End: 2022-07-13

## 2022-07-13 DIAGNOSIS — Z98.890 OTHER SPECIFIED POSTPROCEDURAL STATES: Chronic | ICD-10-CM

## 2022-07-13 DIAGNOSIS — Z98.891 HISTORY OF UTERINE SCAR FROM PREVIOUS SURGERY: Chronic | ICD-10-CM

## 2022-07-13 DIAGNOSIS — Z11.52 ENCOUNTER FOR SCREENING FOR COVID-19: ICD-10-CM

## 2022-07-13 LAB — SARS-COV-2 RNA SPEC QL NAA+PROBE: SIGNIFICANT CHANGE UP

## 2022-07-13 PROCEDURE — C9803: CPT

## 2022-07-13 PROCEDURE — U0003: CPT

## 2022-07-13 PROCEDURE — U0005: CPT

## 2022-07-14 ENCOUNTER — TRANSCRIPTION ENCOUNTER (OUTPATIENT)
Age: 37
End: 2022-07-14

## 2022-07-14 ENCOUNTER — ASOB RESULT (OUTPATIENT)
Age: 37
End: 2022-07-14

## 2022-07-14 ENCOUNTER — APPOINTMENT (OUTPATIENT)
Dept: MATERNAL FETAL MEDICINE | Facility: CLINIC | Age: 37
End: 2022-07-14

## 2022-07-14 ENCOUNTER — APPOINTMENT (OUTPATIENT)
Dept: ANTEPARTUM | Facility: CLINIC | Age: 37
End: 2022-07-14

## 2022-07-14 ENCOUNTER — NON-APPOINTMENT (OUTPATIENT)
Age: 37
End: 2022-07-14

## 2022-07-14 PROCEDURE — 76818 FETAL BIOPHYS PROFILE W/NST: CPT

## 2022-07-15 ENCOUNTER — NON-APPOINTMENT (OUTPATIENT)
Age: 37
End: 2022-07-15

## 2022-07-15 ENCOUNTER — INPATIENT (INPATIENT)
Facility: HOSPITAL | Age: 37
LOS: 2 days | Discharge: HOME | End: 2022-07-18
Attending: OBSTETRICS & GYNECOLOGY | Admitting: OBSTETRICS & GYNECOLOGY
Payer: COMMERCIAL

## 2022-07-15 ENCOUNTER — APPOINTMENT (OUTPATIENT)
Dept: OBGYN | Facility: CLINIC | Age: 37
End: 2022-07-15

## 2022-07-15 VITALS — DIASTOLIC BLOOD PRESSURE: 68 MMHG | HEART RATE: 88 BPM | SYSTOLIC BLOOD PRESSURE: 130 MMHG

## 2022-07-15 DIAGNOSIS — Z98.891 HISTORY OF UTERINE SCAR FROM PREVIOUS SURGERY: Chronic | ICD-10-CM

## 2022-07-15 DIAGNOSIS — Z98.890 OTHER SPECIFIED POSTPROCEDURAL STATES: Chronic | ICD-10-CM

## 2022-07-15 DIAGNOSIS — O10.013 PRE-EXISTING ESSENTIAL HYPERTENSION COMPLICATING PREGNANCY, THIRD TRIMESTER: ICD-10-CM

## 2022-07-15 DIAGNOSIS — Z98.891 HISTORY OF UTERINE SCAR FROM PREVIOUS SURGERY: ICD-10-CM

## 2022-07-15 LAB
ALBUMIN SERPL ELPH-MCNC: 3.6 G/DL — SIGNIFICANT CHANGE UP (ref 3.3–5)
ALP SERPL-CCNC: 170 U/L — HIGH (ref 40–120)
ALT FLD-CCNC: 19 U/L — SIGNIFICANT CHANGE UP (ref 10–45)
ANION GAP SERPL CALC-SCNC: 12 MMOL/L — SIGNIFICANT CHANGE UP (ref 5–17)
APTT BLD: 26.4 SEC — LOW (ref 27.5–35.5)
AST SERPL-CCNC: 16 U/L — SIGNIFICANT CHANGE UP (ref 10–40)
BASOPHILS # BLD AUTO: 0.03 K/UL — SIGNIFICANT CHANGE UP (ref 0–0.2)
BASOPHILS NFR BLD AUTO: 0.4 % — SIGNIFICANT CHANGE UP (ref 0–2)
BILIRUB SERPL-MCNC: 0.2 MG/DL — SIGNIFICANT CHANGE UP (ref 0.2–1.2)
BUN SERPL-MCNC: 7 MG/DL — SIGNIFICANT CHANGE UP (ref 7–23)
CALCIUM SERPL-MCNC: 9.8 MG/DL — SIGNIFICANT CHANGE UP (ref 8.4–10.5)
CHLORIDE SERPL-SCNC: 106 MMOL/L — SIGNIFICANT CHANGE UP (ref 96–108)
CO2 SERPL-SCNC: 18 MMOL/L — LOW (ref 22–31)
COVID-19 SPIKE DOMAIN AB INTERP: POSITIVE
COVID-19 SPIKE DOMAIN ANTIBODY RESULT: >250 U/ML — HIGH
CREAT SERPL-MCNC: 0.42 MG/DL — LOW (ref 0.5–1.3)
EGFR: 129 ML/MIN/1.73M2 — SIGNIFICANT CHANGE UP
EOSINOPHIL # BLD AUTO: 0.12 K/UL — SIGNIFICANT CHANGE UP (ref 0–0.5)
EOSINOPHIL NFR BLD AUTO: 1.7 % — SIGNIFICANT CHANGE UP (ref 0–6)
FIBRINOGEN PPP-MCNC: 878 MG/DL — HIGH (ref 330–520)
GLUCOSE BLDC GLUCOMTR-MCNC: 164 MG/DL — HIGH (ref 70–99)
GLUCOSE BLDC GLUCOMTR-MCNC: 206 MG/DL — HIGH (ref 70–99)
GLUCOSE BLDC GLUCOMTR-MCNC: 92 MG/DL — SIGNIFICANT CHANGE UP (ref 70–99)
GLUCOSE BLDC GLUCOMTR-MCNC: 96 MG/DL — SIGNIFICANT CHANGE UP (ref 70–99)
GLUCOSE SERPL-MCNC: 98 MG/DL — SIGNIFICANT CHANGE UP (ref 70–99)
HCT VFR BLD CALC: 31.9 % — LOW (ref 34.5–45)
HGB BLD-MCNC: 10.3 G/DL — LOW (ref 11.5–15.5)
IMM GRANULOCYTES NFR BLD AUTO: 0.4 % — SIGNIFICANT CHANGE UP (ref 0–1.5)
INR BLD: 1.11 RATIO — SIGNIFICANT CHANGE UP (ref 0.88–1.16)
LDH SERPL L TO P-CCNC: 168 U/L — SIGNIFICANT CHANGE UP (ref 50–242)
LYMPHOCYTES # BLD AUTO: 1.83 K/UL — SIGNIFICANT CHANGE UP (ref 1–3.3)
LYMPHOCYTES # BLD AUTO: 25.9 % — SIGNIFICANT CHANGE UP (ref 13–44)
MCHC RBC-ENTMCNC: 25 PG — LOW (ref 27–34)
MCHC RBC-ENTMCNC: 32.3 GM/DL — SIGNIFICANT CHANGE UP (ref 32–36)
MCV RBC AUTO: 77.4 FL — LOW (ref 80–100)
MONOCYTES # BLD AUTO: 0.48 K/UL — SIGNIFICANT CHANGE UP (ref 0–0.9)
MONOCYTES NFR BLD AUTO: 6.8 % — SIGNIFICANT CHANGE UP (ref 2–14)
NEUTROPHILS # BLD AUTO: 4.57 K/UL — SIGNIFICANT CHANGE UP (ref 1.8–7.4)
NEUTROPHILS NFR BLD AUTO: 64.8 % — SIGNIFICANT CHANGE UP (ref 43–77)
NRBC # BLD: 0 /100 WBCS — SIGNIFICANT CHANGE UP (ref 0–0)
PLATELET # BLD AUTO: 199 K/UL — SIGNIFICANT CHANGE UP (ref 150–400)
POTASSIUM SERPL-MCNC: 3.7 MMOL/L — SIGNIFICANT CHANGE UP (ref 3.5–5.3)
POTASSIUM SERPL-SCNC: 3.7 MMOL/L — SIGNIFICANT CHANGE UP (ref 3.5–5.3)
PROT SERPL-MCNC: 6.9 G/DL — SIGNIFICANT CHANGE UP (ref 6–8.3)
PROTHROM AB SERPL-ACNC: 12.9 SEC — SIGNIFICANT CHANGE UP (ref 10.5–13.4)
RBC # BLD: 4.12 M/UL — SIGNIFICANT CHANGE UP (ref 3.8–5.2)
RBC # FLD: 15.3 % — HIGH (ref 10.3–14.5)
SARS-COV-2 IGG+IGM SERPL QL IA: >250 U/ML — HIGH
SARS-COV-2 IGG+IGM SERPL QL IA: POSITIVE
SODIUM SERPL-SCNC: 136 MMOL/L — SIGNIFICANT CHANGE UP (ref 135–145)
T PALLIDUM AB TITR SER: NEGATIVE — SIGNIFICANT CHANGE UP
URATE SERPL-MCNC: 3.7 MG/DL — SIGNIFICANT CHANGE UP (ref 2.5–7)
WBC # BLD: 7.06 K/UL — SIGNIFICANT CHANGE UP (ref 3.8–10.5)
WBC # FLD AUTO: 7.06 K/UL — SIGNIFICANT CHANGE UP (ref 3.8–10.5)

## 2022-07-15 PROCEDURE — 88302 TISSUE EXAM BY PATHOLOGIST: CPT | Mod: 26

## 2022-07-15 PROCEDURE — 88307 TISSUE EXAM BY PATHOLOGIST: CPT | Mod: 26

## 2022-07-15 PROCEDURE — 86077 PHYS BLOOD BANK SERV XMATCH: CPT

## 2022-07-15 PROCEDURE — 59510 CESAREAN DELIVERY: CPT

## 2022-07-15 RX ORDER — NALOXONE HYDROCHLORIDE 4 MG/.1ML
0.1 SPRAY NASAL
Refills: 0 | Status: DISCONTINUED | OUTPATIENT
Start: 2022-07-15 | End: 2022-07-16

## 2022-07-15 RX ORDER — DEXTROSE 50 % IN WATER 50 %
15 SYRINGE (ML) INTRAVENOUS ONCE
Refills: 0 | Status: DISCONTINUED | OUTPATIENT
Start: 2022-07-15 | End: 2022-07-18

## 2022-07-15 RX ORDER — SODIUM CHLORIDE 9 MG/ML
1000 INJECTION, SOLUTION INTRAVENOUS
Refills: 0 | Status: DISCONTINUED | OUTPATIENT
Start: 2022-07-15 | End: 2022-07-18

## 2022-07-15 RX ORDER — OXYCODONE HYDROCHLORIDE 5 MG/1
5 TABLET ORAL
Refills: 0 | Status: DISCONTINUED | OUTPATIENT
Start: 2022-07-15 | End: 2022-07-16

## 2022-07-15 RX ORDER — INSULIN LISPRO 100/ML
VIAL (ML) SUBCUTANEOUS AT BEDTIME
Refills: 0 | Status: DISCONTINUED | OUTPATIENT
Start: 2022-07-15 | End: 2022-07-18

## 2022-07-15 RX ORDER — DIPHENHYDRAMINE HCL 50 MG
25 CAPSULE ORAL EVERY 6 HOURS
Refills: 0 | Status: DISCONTINUED | OUTPATIENT
Start: 2022-07-15 | End: 2022-07-18

## 2022-07-15 RX ORDER — IBUPROFEN 200 MG
600 TABLET ORAL EVERY 6 HOURS
Refills: 0 | Status: COMPLETED | OUTPATIENT
Start: 2022-07-15 | End: 2023-06-13

## 2022-07-15 RX ORDER — OXYCODONE HYDROCHLORIDE 5 MG/1
5 TABLET ORAL ONCE
Refills: 0 | Status: DISCONTINUED | OUTPATIENT
Start: 2022-07-15 | End: 2022-07-18

## 2022-07-15 RX ORDER — OXYCODONE HYDROCHLORIDE 5 MG/1
5 TABLET ORAL
Refills: 0 | Status: COMPLETED | OUTPATIENT
Start: 2022-07-15 | End: 2022-07-22

## 2022-07-15 RX ORDER — FAMOTIDINE 10 MG/ML
20 INJECTION INTRAVENOUS ONCE
Refills: 0 | Status: COMPLETED | OUTPATIENT
Start: 2022-07-15 | End: 2022-07-15

## 2022-07-15 RX ORDER — DEXTROSE 50 % IN WATER 50 %
25 SYRINGE (ML) INTRAVENOUS ONCE
Refills: 0 | Status: DISCONTINUED | OUTPATIENT
Start: 2022-07-15 | End: 2022-07-18

## 2022-07-15 RX ORDER — DEXAMETHASONE 0.5 MG/5ML
4 ELIXIR ORAL EVERY 6 HOURS
Refills: 0 | Status: DISCONTINUED | OUTPATIENT
Start: 2022-07-15 | End: 2022-07-16

## 2022-07-15 RX ORDER — SIMETHICONE 80 MG/1
80 TABLET, CHEWABLE ORAL EVERY 4 HOURS
Refills: 0 | Status: DISCONTINUED | OUTPATIENT
Start: 2022-07-15 | End: 2022-07-18

## 2022-07-15 RX ORDER — GLUCAGON INJECTION, SOLUTION 0.5 MG/.1ML
1 INJECTION, SOLUTION SUBCUTANEOUS ONCE
Refills: 0 | Status: DISCONTINUED | OUTPATIENT
Start: 2022-07-15 | End: 2022-07-18

## 2022-07-15 RX ORDER — CHLORHEXIDINE GLUCONATE 213 G/1000ML
1 SOLUTION TOPICAL ONCE
Refills: 0 | Status: COMPLETED | OUTPATIENT
Start: 2022-07-15 | End: 2022-07-15

## 2022-07-15 RX ORDER — KETOROLAC TROMETHAMINE 30 MG/ML
30 SYRINGE (ML) INJECTION EVERY 6 HOURS
Refills: 0 | Status: COMPLETED | OUTPATIENT
Start: 2022-07-15 | End: 2022-07-16

## 2022-07-15 RX ORDER — OXYTOCIN 10 UNIT/ML
333.33 VIAL (ML) INJECTION
Qty: 20 | Refills: 0 | Status: DISCONTINUED | OUTPATIENT
Start: 2022-07-15 | End: 2022-07-18

## 2022-07-15 RX ORDER — DEXTROSE 50 % IN WATER 50 %
12.5 SYRINGE (ML) INTRAVENOUS ONCE
Refills: 0 | Status: DISCONTINUED | OUTPATIENT
Start: 2022-07-15 | End: 2022-07-18

## 2022-07-15 RX ORDER — ONDANSETRON 8 MG/1
4 TABLET, FILM COATED ORAL EVERY 6 HOURS
Refills: 0 | Status: DISCONTINUED | OUTPATIENT
Start: 2022-07-15 | End: 2022-07-16

## 2022-07-15 RX ORDER — ACETAMINOPHEN 500 MG
975 TABLET ORAL
Refills: 0 | Status: DISCONTINUED | OUTPATIENT
Start: 2022-07-15 | End: 2022-07-18

## 2022-07-15 RX ORDER — CITRIC ACID/SODIUM CITRATE 300-500 MG
15 SOLUTION, ORAL ORAL ONCE
Refills: 0 | Status: COMPLETED | OUTPATIENT
Start: 2022-07-15 | End: 2022-07-15

## 2022-07-15 RX ORDER — MAGNESIUM HYDROXIDE 400 MG/1
30 TABLET, CHEWABLE ORAL
Refills: 0 | Status: DISCONTINUED | OUTPATIENT
Start: 2022-07-15 | End: 2022-07-18

## 2022-07-15 RX ORDER — MORPHINE SULFATE 50 MG/1
0.1 CAPSULE, EXTENDED RELEASE ORAL ONCE
Refills: 0 | Status: DISCONTINUED | OUTPATIENT
Start: 2022-07-15 | End: 2022-07-16

## 2022-07-15 RX ORDER — LABETALOL HCL 100 MG
100 TABLET ORAL
Refills: 0 | Status: DISCONTINUED | OUTPATIENT
Start: 2022-07-15 | End: 2022-07-18

## 2022-07-15 RX ORDER — LANOLIN
1 OINTMENT (GRAM) TOPICAL EVERY 6 HOURS
Refills: 0 | Status: DISCONTINUED | OUTPATIENT
Start: 2022-07-15 | End: 2022-07-18

## 2022-07-15 RX ORDER — TETANUS TOXOID, REDUCED DIPHTHERIA TOXOID AND ACELLULAR PERTUSSIS VACCINE, ADSORBED 5; 2.5; 8; 8; 2.5 [IU]/.5ML; [IU]/.5ML; UG/.5ML; UG/.5ML; UG/.5ML
0.5 SUSPENSION INTRAMUSCULAR ONCE
Refills: 0 | Status: DISCONTINUED | OUTPATIENT
Start: 2022-07-15 | End: 2022-07-18

## 2022-07-15 RX ORDER — NALBUPHINE HYDROCHLORIDE 10 MG/ML
2.5 INJECTION, SOLUTION INTRAMUSCULAR; INTRAVENOUS; SUBCUTANEOUS EVERY 6 HOURS
Refills: 0 | Status: DISCONTINUED | OUTPATIENT
Start: 2022-07-15 | End: 2022-07-16

## 2022-07-15 RX ORDER — CEFAZOLIN SODIUM 1 G
2000 VIAL (EA) INJECTION ONCE
Refills: 0 | Status: COMPLETED | OUTPATIENT
Start: 2022-07-15 | End: 2022-07-15

## 2022-07-15 RX ORDER — METFORMIN HYDROCHLORIDE 850 MG/1
500 TABLET ORAL
Refills: 0 | Status: DISCONTINUED | OUTPATIENT
Start: 2022-07-15 | End: 2022-07-15

## 2022-07-15 RX ORDER — INSULIN LISPRO 100/ML
VIAL (ML) SUBCUTANEOUS
Refills: 0 | Status: DISCONTINUED | OUTPATIENT
Start: 2022-07-15 | End: 2022-07-18

## 2022-07-15 RX ADMIN — Medication 1000 MILLIUNIT(S)/MIN: at 10:32

## 2022-07-15 RX ADMIN — Medication 975 MILLIGRAM(S): at 22:00

## 2022-07-15 RX ADMIN — CHLORHEXIDINE GLUCONATE 1 APPLICATION(S): 213 SOLUTION TOPICAL at 05:40

## 2022-07-15 RX ADMIN — Medication 30 MILLIGRAM(S): at 17:10

## 2022-07-15 RX ADMIN — ONDANSETRON 4 MILLIGRAM(S): 8 TABLET, FILM COATED ORAL at 10:32

## 2022-07-15 RX ADMIN — Medication 15 MILLILITER(S): at 07:21

## 2022-07-15 RX ADMIN — FAMOTIDINE 20 MILLIGRAM(S): 10 INJECTION INTRAVENOUS at 07:21

## 2022-07-15 RX ADMIN — Medication 1: at 19:12

## 2022-07-15 RX ADMIN — Medication 100 MILLIGRAM(S): at 14:30

## 2022-07-15 RX ADMIN — Medication 30 MILLIGRAM(S): at 18:00

## 2022-07-15 RX ADMIN — Medication 100 MILLIGRAM(S): at 08:01

## 2022-07-15 RX ADMIN — Medication 4 MILLIGRAM(S): at 08:02

## 2022-07-15 RX ADMIN — Medication 975 MILLIGRAM(S): at 21:04

## 2022-07-15 NOTE — OB PROVIDER H&P - NSFIRSTLIVEBIRTH_OBGYN_ALL_OB_DT
20-Apr-2006 Closure 4 Information: This tab is for additional flaps and grafts above and beyond our usual structured repairs.  Please note if you enter information here it will not currently bill and you will need to add the billing information manually.

## 2022-07-15 NOTE — OB PROVIDER DELIVERY SUMMARY - NSPROVIDERDELIVERYNOTE_OBGYN_ALL_OB_FT
rLTCS+BS    Viable male infant, 3380g, 8/9 APGARS, cephalic presentation    Grossly normal uterus, bilateral fallopian tubes, and bilateral ovaries    Hysterotomy was closed in one layer with Vicryl suture. Good hemostasis noted. Bilateral salpingectomy performed with Ligasure device. Muscle reapproximated with chromic gut. Fascia closed with Vicryl. Subcutaneous reapproximated with plain gut. Subcuticular skin closure with Quill monocryl.    EBL: 650  IVF: 2000  UOP: 425    Dictation #[id number] rLTCS+BS    Viable male infant, 3380g, 8/9 APGARS, cephalic presentation    Grossly normal uterus, bilateral fallopian tubes, and bilateral ovaries    Hysterotomy was closed in one layer with Vicryl suture. Good hemostasis noted. Bilateral salpingectomy performed with Ligasure device. Muscle reapproximated with chromic gut. Fascia closed with Vicryl. Subcutaneous reapproximated with plain gut. Subcuticular skin closure with Quill monocryl. Cellsaver used during procedure, no products returned to patient.    EBL: 650  IVF: 2000  UOP: 425    Dictation #[id number]

## 2022-07-15 NOTE — OB RN PATIENT PROFILE - NS_PRENATALLABSOURCEGBS36_OBGYN_ALL_OB
CHIEF COMPLAINT:   Chief Complaint   Patient presents with   • URI     c/o URI x 2 weeks, cough, sore throat and ear pain started 2 days ago   • Other     patient in with wife         HISTORY OF PRESENT ILLNESS:  Ludin is a 23 Bryce Willson MD       hard copy, drawn during this pregnancy

## 2022-07-15 NOTE — OB RN PATIENT PROFILE - NS_DATEOFLASTVISIT_OBGYN_ALL_OB_DT
Patient presents with 3-day history of cough and congestion with nausea and body aches that started during the night.
11-Jul-2022

## 2022-07-15 NOTE — OB RN DELIVERY SUMMARY - NSSELHIDDEN_OBGYN_ALL_OB_FT
[NS_DeliveryAttending1_OBGYN_ALL_OB_FT:[id number]],[NS_DeliveryAssist1_OBGYN_ALL_OB_FT:[id number]],[NS_DeliveryRN_OBGYN_ALL_OB_FT:[id number]],[NS_CirculateRN2_OBGYN_ALL_OB_FT:[id number]]

## 2022-07-15 NOTE — OB RN PATIENT PROFILE - NSMATERNALFETALCONCERNS_OBGYN_ALL_OB_FT
Maternal/Fetal Alert  Maternal Alert - 22 - Patient will decline blood products due to Anabaptist reasons.  Had consults with Heme, aneshtesia, and MFM.  Completed blood avoidance paperwork.  Hx of anemia, myoma, prior  with a vertical T-incision, chronic hypertension, and Type 2 diabetes. Obstetrical huddle should be called at time of admission to L&amp;D.  Closed system cell saver should be available at time of delivery.  If blood loss is substantial, use of enhanced erythropoietin treatment should be considered.   Fetal Alert - Fetal echo normal but limited.  Recommend follow up with peds cardiology at 6-8 weeks of age or sooner if there is a clinical concern. -Verna Lord, NATALIIAC

## 2022-07-15 NOTE — OB RN PATIENT PROFILE - FALL HARM RISK - UNIVERSAL INTERVENTIONS
Bed in lowest position, wheels locked, appropriate side rails in place/Call bell, personal items and telephone in reach/Instruct patient to call for assistance before getting out of bed or chair/Non-slip footwear when patient is out of bed/[place] to call system/Physically safe environment - no spills, clutter or unnecessary equipment/Purposeful Proactive Rounding/Room/bathroom lighting operational, light cord in reach

## 2022-07-15 NOTE — OB PROVIDER DELIVERY SUMMARY - NSSELHIDDEN_OBGYN_ALL_OB_FT
[NS_DeliveryAttending1_OBGYN_ALL_OB_FT:[id number]],[NS_DeliveryAssist1_OBGYN_ALL_OB_FT:[id number]],[NS_DeliveryRN_OBGYN_ALL_OB_FT:[id number]],[NS_CirculateRN2_OBGYN_ALL_OB_FT:[id number]]

## 2022-07-15 NOTE — OB PROVIDER H&P - NSMATERNALFETALCONCERNS_OBGYN_ALL_OB_FT
[FreeTextEntry1] : Child sent to ED for further evaluation and imaging for worsening RLQ pain and CVA tenderness.  
Maternal/Fetal Alert  Maternal Alert - 22 - Patient will decline blood products due to Temple reasons.  Had consults with Heme, aneshtesia, and MFM.  Completed blood avoidance paperwork.  Hx of anemia, myoma, prior  with a vertical T-incision, chronic hypertension, and Type 2 diabetes. Obstetrical huddle should be called at time of admission to L&amp;D.  Closed system cell saver should be available at time of delivery.  If blood loss is substantial, use of enhanced erythropoietin treatment should be considered.   Fetal Alert - Fetal echo normal but limited.  Recommend follow up with peds cardiology at 6-8 weeks of age or sooner if there is a clinical concern. -Verna Lord, NATALIIAC

## 2022-07-15 NOTE — OB PROVIDER H&P - HISTORY OF PRESENT ILLNESS
37 yo  with h/o HTN, Type 2 DM (on insulin and metformin) and anemia(on iron supplementation) at 35 weeks and 5 days' gestation is scheduled for Repeat  Section on 22. Pt is Congregational and has had Anesthesia Consult (22) and Heme Consult (Dr Ferrer 22) with completed Informed Consent For Blood Avoidance, Blood Refusal and Blood Management on chart. She refuses whole elements of blood, but consents to Category 1 Minor Blood Fractions, Category II Synthetic Protein Elements of Blood, Category III and Category IV Cell Saver (per Heme note only where extracorporeal circulation is a closed system without blood storage).    Pt is vaccinated x 2 against Covid-19 and had a mild case of Covid-19 2021, which did not require hospitalization or supplemental oxygen. Pt is scheduled for Covid-19 PCR on 22 at [facility].    R2 Admission H&P    Subjective  HPI: 36y  at 37w presents for scheduled repeat  section. +FM. -LOF. -CTXs. -VB. Pt denies any other concerns.    History of chronic hypertension, taking ?Labetalol for blood pressures. Denies headache, vision changes, epigastric pain, RUQ pain. Per chart review, Labetalol 100 BID.    Additional history of T2DM, following previous pregnancy. Taking Metformin 500 TID and additional insulin in AM and PM, with Humalog before meals ?/?. Patient unsure of medication doses.    – PNC: Denies prenatal issues except as above.  EFW 2976g by rosie 4 wks prior.  – OBHx:   FT 7#11,   FT 7#10, 2018 pLTCS for breech w/ PPROM@36w 5#6 @ [place]  – GynHx: denies fibroids, cysts, endometriosis, abnormal pap smears, STIs  – PMH: cHTN, T2DM  – PSH: CSx1, hip labrum repair  – Psych: denies   – Social: denies   – Meds: PNV   – Allergies: NKDA  – Will accept blood transfusions? Congregational    Objective  – VS  T(C): --  HR: 86 (07-15-22 @ 06:55)  BP: 130/68 (07-15-22 @ 06:19)  RR: --  SpO2: 100% (07-15-22 @ 06:55)    Physical Exam  CV: RRR  Pulm: breathing comfortably on RA  Abd: gravid, nontender  Extr: moving all extremities with ease    – FHT: baseline 125, mod variability, +accels, -decels  – [place]: quiescent  – EFW: _g by sono  – Sono: vertex

## 2022-07-15 NOTE — PRE-ANESTHESIA EVALUATION ADULT - NSANTHOSAYNRD_GEN_A_CORE
neck = 16.25 inches/No. JANE screening performed.  STOP BANG Legend: 0-2 = LOW Risk; 3-4 = INTERMEDIATE Risk; 5-8 = HIGH Risk

## 2022-07-15 NOTE — OB RN INTRAOPERATIVE NOTE - NS_INTERMITTENTPNEUMATICCOMPRESSIONSTART_OBGYN_ALL_OB_DT
Lactation Progress Note      Data:     F/U on primip breast feeder. Mob states that baby has been sleepy. Baby is SGA and blood sugars have been WNL so far. Action: Assisted with good position at breast. Mob expressed much colostrum. Baby eventually rooting and a good latch was achieved for several good suck bursts then sleepy. Reassured of normal sleepy behavior of the first day and explained that baby will likely cluster feed tonight. Encouraged to allow baby to go to breast ad marlene, at least Q 2-3 Hours. Also stressed the importance of always achieving a good deep latch and encouraged to call [address] [place] [address] for assistance prn.  number on board for f/u prn. Response: Verbalized and demonstrated understanding. Lactation Progress Note      Data:     F/U on primip whose 37 week SGA baby has not been feeding well at breast. Mob has been pumping and offering 5-7 ml EBM. Baby had frenotomy and now preparing to attempt at breast feed. Baby will be discharged with a supplement order for breast milk and neosure. Action: Assisted with baby to breast. Mob expressed colostrum to encourage feed. Baby rooting. Eventually latched with a good latch for several good suck bursts. Baby did better than before the frenotomy but remained frustrated at breast. Reassured mob that sometimes baby has to relearn that the breast is a good place. Encouraged to continue to offer the breast ad marlene, but not to let baby get frustrated. Someone other than mob should then offer bottle and she should pump. Stressed the importance of pumping each time the baby gets a supplement. This will assure a good milk supply. Encouraged to call [de-identified] or [facility] clinic for any f/u prn. Response: Parents verbalized and demonstrated understanding and are comfortable with feeding plan for d/c. Lactation Progress Note      Data:    Follow up consult for primip on day 1 pp with a SGA infant born at 37.1 weeks gestation. Infant has only had poor feedings so far with minimal suck bursts noted. Infant is on & off the breast. MOB has been expressing much colostrum & feeding to infant. Infant noted to have heart shaped tip of tongue and significant tongue tie. Action:    Reviewed infant feeding & output log and discuss with MOB how breast feeding has been going. Since infant has been unable to effectively remain latched, educated MOB about the possibility of introducing a nipple shield to help SGA infant maintain latch. MOB agreeable. Educated MOB about nipple shield use, how to clean, and how to apply. Demonstrated application & assisted MOB get infant latched. Infant was able to latch & maintain latch for about 10 suck bursts before coming off the breast fussy. Attempted to re-latch. Infant maintained latch for a few suck bursts before coming off the breast falling asleep. No colostrum visible in shield upon release. MOB states this is the longest infant has been able to maintain latch. Bundled infant & helped MOB with hand expression. 2.5 ml easily hand expressed. Educated MOB & demonstrated how to feed expressed colostrum back to infant via syringe while sucking on a gloved finger. Infant tolerated expressed colostrum well. Since infant is now over 24 hours and has not been feeding effectively, suggested MOB begin using a breast pump after next feeding attempt to bring in mature milk & protect milk supply. MOB agreeable. All questions answered & MOB encouraged to call for support with next feeding attempt. Response:    MOB pleased with progress when using nipple shield & verbalized an understanding of education provided, will call for assistance at next feed. Lactation Progress Note      Data:    Follow up consult with angie on day 1 pp with a SGA infant born at 37.1 weeks gestation. Infant has only had poor feedings so far with minimal suck bursts noted. Infant is on & off the breast. MOB has been expressing much colostrum & feeding to infant. Infant noted to have heart shaped tip of tongue and significant tongue tie. Also, has a recessed chin. This consult was to set MOB up with a breast pump & try to get infant latched with nipple shield. Action:    Brought breast pump & all supplies to mothers' room & educated her about pump parts, how to use, how often to use, and how to clean parts. MOB pumped 14 ml of colostrum. Educated MOB about how to remove flanges & how to draw expressed colostrum up into syringe. Assisted MOB try to get infant latched at right breast, 24 mm nipple shield is too small for larger right nipple. Attempted w/o shield. Infant unable to latch. Attempted at left breast with nipple shield. Infant was able to latch for 10 sucks before coming off the breast fussing. Tried a couple more times with same result. Suggested we go ahead and feed expressed colostrum back to crying infant. Calmed infant, then educated & demonstrated for MOB how to feed expressed colostrum back to infant via syringe while sucking on a gloved finger. Infant took 6 ml & tolerated well. MOB burped infant & infant became fussy again, but was not showing feeding cues. Educated MOB about the benefits of pacifier use for SGA/  infants for suck training. MOB agreeable. Infant instantly soothed with pacifier. Showed MOB how to flip infants bottom lip outward when sucking. All questions answered & reviewed feeding plan with MOB. Encouraged MOB to call for support with next feeding. Plan: Attempt to latch infant with shield.  (Right nipple is too big for the 24 mm shield, so that side she will attempt w/o shield)  After, offer expressed colostrum via syringe Lactation Progress Note      Data:   RN requesting LC f/u with primip breast feeder whose baby has not been latching well. Mob initiated pumping last evening and is collecting 5-7 ml. Baby has a frenulum which will be clipped today. Action: Assisted with baby to breast. Baby rooting and a few suck bursts were achieved and then baby began crying. Baby crying and then asleep. LC unable to stimulate suck with gloved finger. Baby then handed to visitor and [address] [place] [address] [employer] assisted with pumping. Encouraged to call [address] [place] [address] [employer] for assistance with BF attempt after frenotomy. [address] [place] [address] number on board. Response: Verbalized and demonstrated understanding. until breast feeding is well established. Encouraged good hydration, nutrition, and rest, and to keep taking prenatal vitamin while lactating. Encouraged much skin to skin between mother and infant. Breast feeding log explained, all questions answered. Mother instructed to call lactation for F/U care as needed. Response:    MOB verbalized an understanding of education provided and will call for assistance as needed. 15-Jul-2022 08:10

## 2022-07-15 NOTE — OB NEONATOLOGY/PEDIATRICIAN DELIVERY SUMMARY - NSPEDSNEONOTESA_OBGYN_ALL_OB_FT
Requested by L&BELA hudson to assess this infant at 50 minutes of life for increased work of breathing, desaturations, nasal flaring and grunting. Male infant delivered via repeat  delivery at 37 weeks for maternal reason: previous T-shaped c/s for footling breech presentation and myoma/uterine surgery. Mother is a 37 year old,  , blood type O pos.  Prenatal labs as follow: HIV neg, RPR non-reactive, rubella immune, HBsA neg, GBS neg on  Maternal history significant for chronic HTN on labetalol, Type II DM on insulin and metformin, anemia, on iron, and hip labrum repair. Prenatal history significant for  X 2  and , C/S at 35 weeks in 2016 for breech and PPROM. This prenancy was uncomplicated. ROM at delivery with clear fluids. Reportedly infant emerged  Infant emerged vertex, vigorous, with good tone.  Dried, suctioned and stimulated . Apgars  8/9. Inafnmt wrapped and was held by father for approximately 45 minutes. On return to the warmer infant noted to be with increased work of breathing and desats to high 80s. PEDS were called to evaluate. Started nasal CPAP at max PEEP of 6, and 30 % FiO2. D-stick at 1 hour 42. Infant admitted to NICU on NCPAP 6 30% for further management of care. Parents updated. Requested by L&BELA hudson to assess this infant at 50 minutes of life for increased work of breathing, desaturations, nasal flaring and grunting. Male infant delivered via repeat  delivery at 37 weeks for maternal reason: previous T-shaped c/s for A pos.  Prenatal labs as follow: HIV neg, RPR non-reactive, rubella immune, HBsA neg, GBS neg on  Maternal history significant for chronic HTN on labetalol, Type II DM on insulin and metformin, anemia, on iron, and hip labrum repair. Prenatal history significant for  X 2  and , C/S at 35 weeks in 2016 for breech and PPROM. This prenancy was uncomplicated. ROM at delivery with clear fluids. Reportedly infant emerged  Infant emerged vertex, vigorous, with good tone.  Dried, suctioned and stimulated . Apgars  8/9. Inafnmt wrapped and was held by father for approximately 45 minutes. On return to the warmer infant noted to be with increased work of breathing and desats to high 80s. PEDS were called to evaluate. Started nasal CPAP at max PEEP of 6, and 30 % FiO2. D-stick at 1 hour 42. Infant admitted to NICU on NCPAP 6 30% for further management of care. Parents updated.

## 2022-07-15 NOTE — PRE-ANESTHESIA EVALUATION ADULT - NSANTHPMHFT_GEN_ALL_CORE
h/o HTN, Type 2 DM (on insulin and metformin) and anemia(on iron supplementation) at 35 weeks and 5 days' gestation.Evangelical and has had Anesthesia Consult (22) and Heme Consult (Dr Ferrer 22) with completed Informed Consent For Blood Avoidance, Blood Refusal and Blood Management on chart. She refuses whole elements of blood, but consents to Category 1 Minor Blood Fractions, Category II Synthetic Protein Elements of Blood, Category III and Category IV Cell Saver (per Heme note only where extracorporeal circulation is a closed system without blood storage).  x 2 (2006 and 10/1/2009); C Section 2018

## 2022-07-15 NOTE — OB RN DELIVERY SUMMARY - NSMATERNALFETALCONCERNS_OBGYN_ALL_OB_FT
Maternal/Fetal Alert  Maternal Alert - 22 - Patient will decline blood products due to [employer] reasons.  Had consults with Heme, aneshtesia, and MFM.  Completed blood avoidance paperwork.  Hx of anemia, myoma, prior  with a vertical T-incision, chronic hypertension, and Type 2 diabetes. Obstetrical huddle should be called at time of admission to L&amp;D.  Closed system cell saver should be available at time of delivery.  If blood loss is substantial, use of enhanced erythropoietin treatment should be considered.   Fetal Alert - Fetal echo normal but limited.  Recommend follow up with peds cardiology at 6-8 weeks of age or sooner if there is a clinical concern. -Verna Lord, NATALIIAC

## 2022-07-15 NOTE — OB RN PATIENT PROFILE - PRO FEEDING PLAN INFANT OB
initiation of breastfeeding/breast milk feeding Both/initiation of breastfeeding/breast milk feeding

## 2022-07-15 NOTE — OB RN DELIVERY SUMMARY - NS_SEPSISRSKCALC_OBGYN_ALL_OB_FT
EOS calculated successfully. EOS Risk Factor: 0.5/1000 live births ([facility] national incidence); GA=37w;Temp=98.6; ROM=0.017; GBS='Negative'; Antibiotics='No antibiotics or any antibiotics < 2 hrs prior to birth'

## 2022-07-15 NOTE — OB PROVIDER DELIVERY SUMMARY - NSMATERNALFETALCONCERNS_OBGYN_ALL_OB_FT
Maternal/Fetal Alert  Maternal Alert - 22 - Patient will decline blood products due to [employer] reasons.  Had consults with Heme, aneshtesia, and MFM.  Completed blood avoidance paperwork.  Hx of anemia, myoma, prior  with a vertical T-incision, chronic hypertension, and Type 2 diabetes. Obstetrical huddle should be called at time of admission to L&amp;D.  Closed system cell saver should be available at time of delivery.  If blood loss is substantial, use of enhanced erythropoietin treatment should be considered.   Fetal Alert - Fetal echo normal but limited.  Recommend follow up with peds cardiology at 6-8 weeks of age or sooner if there is a clinical concern. -Verna Lord, NATALIIAC

## 2022-07-15 NOTE — OB RN PATIENT PROFILE - NSICDXPASTMEDICALHX_GEN_ALL_CORE_FT
PAST MEDICAL HISTORY:  Anemia     Chronic back pain     COVID-19 12/2021 - mild case, did not require hospitalization or supplemental oxygen    DM (diabetes mellitus) Type 2 DM    HTN (hypertension)     Labral tear of hip joint left hip    Lumbar disc herniation h/o MARY    Refusal of blood transfusions as patient is Amish

## 2022-07-15 NOTE — OB RN INTRAOPERATIVE NOTE - NSSELHIDDEN_OBGYN_ALL_OB_FT
[NS_DeliveryAttending1_OBGYN_ALL_OB_FT:[id number]],[NS_DeliveryAssist1_OBGYN_ALL_OB_FT:[id number]],[NS_DeliveryRN_OBGYN_ALL_OB_FT:[id number]==],[NS_CirculateRN2_OBGYN_ALL_OB_FT:[id number]]

## 2022-07-15 NOTE — OB NEONATOLOGY/PEDIATRICIAN DELIVERY SUMMARY - NSMATERNALFETALCONCERNS_OBGYN_ALL_OB_FT
Maternal/Fetal Alert  Maternal Alert - 22 - Patient will decline blood products due to [employer] reasons.  Had consults with Heme, aneshtesia, and MFM.  Completed blood avoidance paperwork.  Hx of anemia, myoma, prior  with a vertical T-incision, chronic hypertension, and Type 2 diabetes. Obstetrical huddle should be called at time of admission to L&amp;D.  Closed system cell saver should be available at time of delivery.  If blood loss is substantial, use of enhanced erythropoietin treatment should be considered.   Fetal Alert - Fetal echo normal but limited.  Recommend follow up with peds cardiology at 6-8 weeks of age or sooner if there is a clinical concern. -Verna Lord, NATALIIAC

## 2022-07-15 NOTE — OB PROVIDER H&P - ASSESSMENT
Assessment  36y  at 37w presents for scheduled repeat  section    Plan  1. Admit to L+D for scheduled  section. Routine Labs. IVF.  2. NPO since 1130 pm .    Plan per attending physician, Dr. Lucas Galindo MD  PGY2

## 2022-07-15 NOTE — OB PROVIDER H&P - ATTENDING COMMENTS
37yo P3 @ 237 wks admitted for repeat C/S  and sterilization w preg complicated by AMA, anemia, refusal blood products (see Heme consult), HTN on meds and poorly controlled DM2  NPO  IVF  cell saver in room, IV Iron treament this week    no desire for future fertility    reviewed risk of surgery--infection, bleeding, hemorrhage, injury to surrounding anatomy, inability to complete sterilization (bleeding, scare tissue)    all questoins answered  Pt agrees w plan    INDIANA Zavala MD  attending

## 2022-07-15 NOTE — OB PROVIDER H&P - NS_OBGYNHISTORY_OBGYN_ALL_OB_FT
GYN - uterine fibroid noted during current pregnancy  OB History -  x 2 (2006 and 10/1/2009); C Section 2018 primary c section due to footling breech 35 weeks 4 days

## 2022-07-16 LAB
GLUCOSE BLDC GLUCOMTR-MCNC: 103 MG/DL — HIGH (ref 70–99)
GLUCOSE BLDC GLUCOMTR-MCNC: 113 MG/DL — HIGH (ref 70–99)
GLUCOSE BLDC GLUCOMTR-MCNC: 121 MG/DL — HIGH (ref 70–99)
GLUCOSE BLDC GLUCOMTR-MCNC: 131 MG/DL — HIGH (ref 70–99)
HCT VFR BLD CALC: 26.5 % — LOW (ref 34.5–45)
HGB BLD-MCNC: 8.5 G/DL — LOW (ref 11.5–15.5)
MCHC RBC-ENTMCNC: 25.1 PG — LOW (ref 27–34)
MCHC RBC-ENTMCNC: 32.1 GM/DL — SIGNIFICANT CHANGE UP (ref 32–36)
MCV RBC AUTO: 78.4 FL — LOW (ref 80–100)
NRBC # BLD: 0 /100 WBCS — SIGNIFICANT CHANGE UP (ref 0–0)
PLATELET # BLD AUTO: 167 K/UL — SIGNIFICANT CHANGE UP (ref 150–400)
RBC # BLD: 3.38 M/UL — LOW (ref 3.8–5.2)
RBC # FLD: 15.5 % — HIGH (ref 10.3–14.5)
WBC # BLD: 9.97 K/UL — SIGNIFICANT CHANGE UP (ref 3.8–10.5)
WBC # FLD AUTO: 9.97 K/UL — SIGNIFICANT CHANGE UP (ref 3.8–10.5)

## 2022-07-16 RX ORDER — OXYCODONE HYDROCHLORIDE 5 MG/1
5 TABLET ORAL ONCE
Refills: 0 | Status: DISCONTINUED | OUTPATIENT
Start: 2022-07-16 | End: 2022-07-17

## 2022-07-16 RX ORDER — METFORMIN HYDROCHLORIDE 850 MG/1
500 TABLET ORAL
Refills: 0 | Status: DISCONTINUED | OUTPATIENT
Start: 2022-07-16 | End: 2022-07-17

## 2022-07-16 RX ORDER — HEPARIN SODIUM 5000 [USP'U]/ML
5000 INJECTION INTRAVENOUS; SUBCUTANEOUS EVERY 12 HOURS
Refills: 0 | Status: DISCONTINUED | OUTPATIENT
Start: 2022-07-16 | End: 2022-07-18

## 2022-07-16 RX ORDER — IBUPROFEN 200 MG
600 TABLET ORAL EVERY 6 HOURS
Refills: 0 | Status: DISCONTINUED | OUTPATIENT
Start: 2022-07-16 | End: 2022-07-18

## 2022-07-16 RX ORDER — OXYCODONE HYDROCHLORIDE 5 MG/1
5 TABLET ORAL ONCE
Refills: 0 | Status: DISCONTINUED | OUTPATIENT
Start: 2022-07-16 | End: 2022-07-18

## 2022-07-16 RX ORDER — ASCORBIC ACID 60 MG
500 TABLET,CHEWABLE ORAL DAILY
Refills: 0 | Status: DISCONTINUED | OUTPATIENT
Start: 2022-07-16 | End: 2022-07-18

## 2022-07-16 RX ORDER — OXYCODONE HYDROCHLORIDE 5 MG/1
5 TABLET ORAL
Refills: 0 | Status: DISCONTINUED | OUTPATIENT
Start: 2022-07-16 | End: 2022-07-18

## 2022-07-16 RX ORDER — FERROUS SULFATE 325(65) MG
325 TABLET ORAL THREE TIMES A DAY
Refills: 0 | Status: DISCONTINUED | OUTPATIENT
Start: 2022-07-16 | End: 2022-07-18

## 2022-07-16 RX ADMIN — OXYCODONE HYDROCHLORIDE 5 MILLIGRAM(S): 5 TABLET ORAL at 18:55

## 2022-07-16 RX ADMIN — Medication 975 MILLIGRAM(S): at 09:59

## 2022-07-16 RX ADMIN — NALBUPHINE HYDROCHLORIDE 2.5 MILLIGRAM(S): 10 INJECTION, SOLUTION INTRAMUSCULAR; INTRAVENOUS; SUBCUTANEOUS at 00:28

## 2022-07-16 RX ADMIN — Medication 975 MILLIGRAM(S): at 21:30

## 2022-07-16 RX ADMIN — Medication 100 MILLIGRAM(S): at 15:53

## 2022-07-16 RX ADMIN — Medication 30 MILLIGRAM(S): at 01:47

## 2022-07-16 RX ADMIN — METFORMIN HYDROCHLORIDE 500 MILLIGRAM(S): 850 TABLET ORAL at 18:21

## 2022-07-16 RX ADMIN — OXYCODONE HYDROCHLORIDE 5 MILLIGRAM(S): 5 TABLET ORAL at 21:06

## 2022-07-16 RX ADMIN — Medication 30 MILLIGRAM(S): at 06:29

## 2022-07-16 RX ADMIN — Medication 500 MILLIGRAM(S): at 11:58

## 2022-07-16 RX ADMIN — Medication 600 MILLIGRAM(S): at 18:51

## 2022-07-16 RX ADMIN — Medication 975 MILLIGRAM(S): at 02:58

## 2022-07-16 RX ADMIN — Medication 30 MILLIGRAM(S): at 06:22

## 2022-07-16 RX ADMIN — HEPARIN SODIUM 5000 UNIT(S): 5000 INJECTION INTRAVENOUS; SUBCUTANEOUS at 00:27

## 2022-07-16 RX ADMIN — Medication 975 MILLIGRAM(S): at 16:24

## 2022-07-16 RX ADMIN — OXYCODONE HYDROCHLORIDE 5 MILLIGRAM(S): 5 TABLET ORAL at 21:30

## 2022-07-16 RX ADMIN — Medication 600 MILLIGRAM(S): at 18:21

## 2022-07-16 RX ADMIN — Medication 30 MILLIGRAM(S): at 00:27

## 2022-07-16 RX ADMIN — Medication 975 MILLIGRAM(S): at 15:54

## 2022-07-16 RX ADMIN — Medication 100 MILLIGRAM(S): at 02:58

## 2022-07-16 RX ADMIN — Medication 325 MILLIGRAM(S): at 21:06

## 2022-07-16 RX ADMIN — Medication 975 MILLIGRAM(S): at 09:29

## 2022-07-16 RX ADMIN — HEPARIN SODIUM 5000 UNIT(S): 5000 INJECTION INTRAVENOUS; SUBCUTANEOUS at 11:57

## 2022-07-16 RX ADMIN — Medication 325 MILLIGRAM(S): at 11:58

## 2022-07-16 RX ADMIN — Medication 975 MILLIGRAM(S): at 04:50

## 2022-07-16 RX ADMIN — OXYCODONE HYDROCHLORIDE 5 MILLIGRAM(S): 5 TABLET ORAL at 18:33

## 2022-07-16 RX ADMIN — Medication 600 MILLIGRAM(S): at 12:05

## 2022-07-16 RX ADMIN — NALBUPHINE HYDROCHLORIDE 2.5 MILLIGRAM(S): 10 INJECTION, SOLUTION INTRAMUSCULAR; INTRAVENOUS; SUBCUTANEOUS at 01:30

## 2022-07-16 RX ADMIN — Medication 975 MILLIGRAM(S): at 21:06

## 2022-07-17 ENCOUNTER — TRANSCRIPTION ENCOUNTER (OUTPATIENT)
Age: 37
End: 2022-07-17

## 2022-07-17 LAB
GLUCOSE BLDC GLUCOMTR-MCNC: 104 MG/DL — HIGH (ref 70–99)
GLUCOSE BLDC GLUCOMTR-MCNC: 115 MG/DL — HIGH (ref 70–99)
GLUCOSE BLDC GLUCOMTR-MCNC: 135 MG/DL — HIGH (ref 70–99)
GLUCOSE BLDC GLUCOMTR-MCNC: 94 MG/DL — SIGNIFICANT CHANGE UP (ref 70–99)

## 2022-07-17 RX ORDER — METFORMIN HYDROCHLORIDE 850 MG/1
1000 TABLET ORAL EVERY 12 HOURS
Refills: 0 | Status: DISCONTINUED | OUTPATIENT
Start: 2022-07-17 | End: 2022-07-18

## 2022-07-17 RX ORDER — METFORMIN HYDROCHLORIDE 850 MG/1
1 TABLET ORAL
Qty: 60 | Refills: 0
Start: 2022-07-17 | End: 2022-08-15

## 2022-07-17 RX ADMIN — Medication 600 MILLIGRAM(S): at 14:47

## 2022-07-17 RX ADMIN — Medication 600 MILLIGRAM(S): at 22:05

## 2022-07-17 RX ADMIN — Medication 100 MILLIGRAM(S): at 06:18

## 2022-07-17 RX ADMIN — METFORMIN HYDROCHLORIDE 1000 MILLIGRAM(S): 850 TABLET ORAL at 18:22

## 2022-07-17 RX ADMIN — Medication 975 MILLIGRAM(S): at 12:39

## 2022-07-17 RX ADMIN — Medication 325 MILLIGRAM(S): at 14:48

## 2022-07-17 RX ADMIN — HEPARIN SODIUM 5000 UNIT(S): 5000 INJECTION INTRAVENOUS; SUBCUTANEOUS at 18:13

## 2022-07-17 RX ADMIN — Medication 975 MILLIGRAM(S): at 19:02

## 2022-07-17 RX ADMIN — Medication 600 MILLIGRAM(S): at 21:35

## 2022-07-17 RX ADMIN — Medication 100 MILLIGRAM(S): at 18:11

## 2022-07-17 RX ADMIN — Medication 600 MILLIGRAM(S): at 10:03

## 2022-07-17 RX ADMIN — Medication 600 MILLIGRAM(S): at 15:15

## 2022-07-17 RX ADMIN — METFORMIN HYDROCHLORIDE 500 MILLIGRAM(S): 850 TABLET ORAL at 06:18

## 2022-07-17 RX ADMIN — Medication 975 MILLIGRAM(S): at 18:14

## 2022-07-17 RX ADMIN — Medication 975 MILLIGRAM(S): at 06:18

## 2022-07-17 RX ADMIN — Medication 500 MILLIGRAM(S): at 12:39

## 2022-07-17 RX ADMIN — OXYCODONE HYDROCHLORIDE 5 MILLIGRAM(S): 5 TABLET ORAL at 22:05

## 2022-07-17 RX ADMIN — HEPARIN SODIUM 5000 UNIT(S): 5000 INJECTION INTRAVENOUS; SUBCUTANEOUS at 06:18

## 2022-07-17 RX ADMIN — Medication 325 MILLIGRAM(S): at 10:03

## 2022-07-17 RX ADMIN — Medication 325 MILLIGRAM(S): at 21:36

## 2022-07-17 RX ADMIN — Medication 975 MILLIGRAM(S): at 13:05

## 2022-07-17 RX ADMIN — Medication 975 MILLIGRAM(S): at 06:45

## 2022-07-17 RX ADMIN — Medication 600 MILLIGRAM(S): at 10:33

## 2022-07-17 NOTE — DISCHARGE NOTE OB - HOSPITAL COURSE
37yo  postop after repeat  at 37 wks for prior T incision, and poorly controlled diabetes. Pt doing well and stable for discharge home.   Postop course complicated by postop anemia releated to blood loss in setting of antepartum anemia.   continue [place] supplementation                          8.5    9.97  )-----------( 167      ( 2022 06:39 )             26.5

## 2022-07-17 NOTE — DISCHARGE NOTE OB - MEDICATION SUMMARY - MEDICATIONS TO TAKE
I will START or STAY ON the medications listed below when I get home from the hospital:    acetaminophen 325 mg oral tablet  -- 3 tab(s) by mouth every 6 hours, As Needed  -- Indication: For Pain    ibuprofen 600 mg oral tablet  -- 1 tab(s) by mouth every 6 hours  -- Indication: For Pain    oxyCODONE 5 mg oral tablet  -- 1 tab(s) by mouth every 3 hours, As needed, Moderate to Severe Pain (4-10)  -- Indication: For Increased pain    metFORMIN 1000 mg oral tablet  -- 1 tab(s) by mouth 2 times a day   -- Check with your doctor before becoming pregnant.  Do not drink alcoholic beverages when taking this medication.  It is very important that you take or use this exactly as directed.  Do not skip doses or discontinue unless directed by your doctor.  Obtain medical advice before taking any non-prescription drugs as some may affect the action of this medication.  Take with food or milk.    -- Indication: For diabetes    labetalol 100 mg oral tablet  -- 1 tab(s) by mouth 2 times a day  -- Indication: For Blood pressure    Prenatal Multivitamins oral tablet  -- 1 tab(s) by mouth once a day  -- Indication: For breastfeeding    ferrous sulfate 325 mg (65 mg elemental iron) oral tablet  -- 1 tab(s) by mouth 3 times a day  -- Indication: For Anemia

## 2022-07-17 NOTE — CHART NOTE - NSCHARTNOTEFT_GEN_A_CORE
36F T2DM on Glipizide/Metformin before pregnancy, now post-partum. FS 90-110s.   Can start patient on Metformin 1000 mg BID while inpatient and d/c with this regimen. Can f/u outpatient     Can call back with any other questions    Discussed with primary team    Amber Morales MD  Endocrine Fellow  Can be reached via teams.     For all urgent matters, can call answering service at [phone] (weekdays); [phone] (nights/weekends)  Any non-urgent consults or questions can be emailed to [email or web address] or [email or web address]

## 2022-07-17 NOTE — DISCHARGE NOTE OB - MEDICATION SUMMARY - MEDICATIONS TO STOP TAKING
I will STOP taking the medications listed below when I get home from the hospital:    Lantus 100 units/mL subcutaneous solution  -- 36 unit(s) subcutaneous once a day (at bedtime)    Lantus 100 units/mL subcutaneous solution  -- 20 unit(s) subcutaneous once a day (in the morning)    aspirin 81 mg oral tablet  -- 1 tab(s) by mouth every other day (odd days) alternating with [id number] mg every other day (even days)    aspirin 81 mg oral tablet  -- 2 tab(s) by mouth every other day even days, alternating with aspirin 81 mg every other day (odd  days)    metFORMIN 500 mg oral tablet  -- 1 tab(s) by mouth once a day (in the morning)    metFORMIN 500 mg oral tablet  -- 2 tab(s) by mouth once a day (in the evening)    HumaLOG 100 units/mL injectable solution  -- 16 unit(s) injectable once a day (in the morning)    HumaLOG 100 units/mL injectable solution  -- 28 unit(s) injectable once a day (in the afternoon)    HumaLOG 100 units/mL injectable solution  -- 30 unit(s) injectable once a day (in the evening)

## 2022-07-17 NOTE — DISCHARGE NOTE OB - PLAN OF CARE
home stable w continued diabetes and hypertension medications  continue Iron supplantation   call if severe headache, chest pain, worsening BPs or worsening pain

## 2022-07-17 NOTE — DISCHARGE NOTE OB - CARE PLAN
Principal Discharge DX:	 delivery delivered  Assessment and plan of treatment:	home stable w continued diabetes and hypertension medications  continue Iron supplantation   call if severe headache, chest pain, worsening BPs or worsening pain   1

## 2022-07-17 NOTE — DISCHARGE NOTE OB - CARE PROVIDER_API CALL
Emilia Zavala)  OBSN  General  [address] [address]  Phone: [phone]  Fax: [phone]  Follow Up Time:

## 2022-07-17 NOTE — DISCHARGE NOTE OB - NS MD DC FALL RISK RISK
For information on Fall & Injury Prevention, visit: [email or web address] OR  [email or web address] OR  https://www.cdc.gov/steadi/patient.html

## 2022-07-17 NOTE — DISCHARGE NOTE OB - PATIENT PORTAL LINK FT
You can access the FollowMyHealth Patient Portal offered by [facility] by registering at the following website: [email or web address]. By joining [employer]’s FollowMyHealth portal, you will also be able to view your health information using other applications (apps) compatible with our system.

## 2022-07-18 VITALS
HEART RATE: 78 BPM | OXYGEN SATURATION: 100 % | SYSTOLIC BLOOD PRESSURE: 127 MMHG | TEMPERATURE: 98 F | RESPIRATION RATE: 18 BRPM | DIASTOLIC BLOOD PRESSURE: 81 MMHG

## 2022-07-18 DIAGNOSIS — D62 ACUTE POSTHEMORRHAGIC ANEMIA: ICD-10-CM

## 2022-07-18 LAB — GLUCOSE BLDC GLUCOMTR-MCNC: 109 MG/DL — HIGH (ref 70–99)

## 2022-07-18 PROCEDURE — 86870 RBC ANTIBODY IDENTIFICATION: CPT

## 2022-07-18 PROCEDURE — 88302 TISSUE EXAM BY PATHOLOGIST: CPT

## 2022-07-18 PROCEDURE — 85025 COMPLETE CBC W/AUTO DIFF WBC: CPT

## 2022-07-18 PROCEDURE — 86769 SARS-COV-2 COVID-19 ANTIBODY: CPT

## 2022-07-18 PROCEDURE — 86905 BLOOD TYPING RBC ANTIGENS: CPT

## 2022-07-18 PROCEDURE — 86780 TREPONEMA PALLIDUM: CPT

## 2022-07-18 PROCEDURE — 36415 COLL VENOUS BLD VENIPUNCTURE: CPT

## 2022-07-18 PROCEDURE — 84550 ASSAY OF BLOOD/URIC ACID: CPT

## 2022-07-18 PROCEDURE — 59050 FETAL MONITOR W/REPORT: CPT

## 2022-07-18 PROCEDURE — 83615 LACTATE (LD) (LDH) ENZYME: CPT

## 2022-07-18 PROCEDURE — 86880 COOMBS TEST DIRECT: CPT

## 2022-07-18 PROCEDURE — 82962 GLUCOSE BLOOD TEST: CPT

## 2022-07-18 PROCEDURE — 88307 TISSUE EXAM BY PATHOLOGIST: CPT

## 2022-07-18 PROCEDURE — 85610 PROTHROMBIN TIME: CPT

## 2022-07-18 PROCEDURE — 85027 COMPLETE CBC AUTOMATED: CPT

## 2022-07-18 PROCEDURE — 86900 BLOOD TYPING SEROLOGIC ABO: CPT

## 2022-07-18 PROCEDURE — 85384 FIBRINOGEN ACTIVITY: CPT

## 2022-07-18 PROCEDURE — 80053 COMPREHEN METABOLIC PANEL: CPT

## 2022-07-18 PROCEDURE — 85730 THROMBOPLASTIN TIME PARTIAL: CPT

## 2022-07-18 PROCEDURE — 86850 RBC ANTIBODY SCREEN: CPT

## 2022-07-18 PROCEDURE — 86901 BLOOD TYPING SEROLOGIC RH(D): CPT

## 2022-07-18 PROCEDURE — 59025 FETAL NON-STRESS TEST: CPT

## 2022-07-18 RX ORDER — INSULIN GLARGINE 100 [IU]/ML
36 INJECTION, SOLUTION SUBCUTANEOUS
Qty: 0 | Refills: 0 | DISCHARGE

## 2022-07-18 RX ORDER — METFORMIN HYDROCHLORIDE 850 MG/1
1 TABLET ORAL
Qty: 0 | Refills: 0 | DISCHARGE

## 2022-07-18 RX ORDER — INSULIN LISPRO 100/ML
28 VIAL (ML) SUBCUTANEOUS
Qty: 0 | Refills: 0 | DISCHARGE

## 2022-07-18 RX ORDER — INSULIN GLARGINE 100 [IU]/ML
20 INJECTION, SOLUTION SUBCUTANEOUS
Qty: 0 | Refills: 0 | DISCHARGE

## 2022-07-18 RX ORDER — IBUPROFEN 200 MG
1 TABLET ORAL
Qty: 0 | Refills: 0 | DISCHARGE
Start: 2022-07-18

## 2022-07-18 RX ORDER — OXYCODONE HYDROCHLORIDE 5 MG/1
1 TABLET ORAL
Qty: 0 | Refills: 0 | DISCHARGE
Start: 2022-07-18

## 2022-07-18 RX ORDER — INSULIN LISPRO 100/ML
16 VIAL (ML) SUBCUTANEOUS
Qty: 0 | Refills: 0 | DISCHARGE

## 2022-07-18 RX ORDER — ACETAMINOPHEN 500 MG
3 TABLET ORAL
Qty: 0 | Refills: 0 | DISCHARGE
Start: 2022-07-18

## 2022-07-18 RX ORDER — ASPIRIN/CALCIUM CARB/MAGNESIUM 324 MG
2 TABLET ORAL
Qty: 0 | Refills: 0 | DISCHARGE

## 2022-07-18 RX ORDER — ASPIRIN/CALCIUM CARB/MAGNESIUM 324 MG
1 TABLET ORAL
Qty: 0 | Refills: 0 | DISCHARGE

## 2022-07-18 RX ORDER — METFORMIN HYDROCHLORIDE 850 MG/1
2 TABLET ORAL
Qty: 0 | Refills: 0 | DISCHARGE

## 2022-07-18 RX ORDER — OXYCODONE 5 MG/1
5 TABLET ORAL
Qty: 20 | Refills: 0 | Status: ACTIVE | COMMUNITY
Start: 2022-07-18 | End: 1900-01-01

## 2022-07-18 RX ORDER — INSULIN LISPRO 100/ML
30 VIAL (ML) SUBCUTANEOUS
Qty: 0 | Refills: 0 | DISCHARGE

## 2022-07-18 RX ADMIN — Medication 975 MILLIGRAM(S): at 06:50

## 2022-07-18 RX ADMIN — Medication 600 MILLIGRAM(S): at 09:45

## 2022-07-18 RX ADMIN — HEPARIN SODIUM 5000 UNIT(S): 5000 INJECTION INTRAVENOUS; SUBCUTANEOUS at 06:16

## 2022-07-18 RX ADMIN — Medication 975 MILLIGRAM(S): at 01:05

## 2022-07-18 RX ADMIN — Medication 975 MILLIGRAM(S): at 00:32

## 2022-07-18 RX ADMIN — Medication 600 MILLIGRAM(S): at 04:29

## 2022-07-18 RX ADMIN — METFORMIN HYDROCHLORIDE 1000 MILLIGRAM(S): 850 TABLET ORAL at 06:16

## 2022-07-18 RX ADMIN — Medication 100 MILLIGRAM(S): at 06:16

## 2022-07-18 RX ADMIN — Medication 600 MILLIGRAM(S): at 05:00

## 2022-07-18 RX ADMIN — Medication 600 MILLIGRAM(S): at 09:11

## 2022-07-18 RX ADMIN — Medication 325 MILLIGRAM(S): at 09:11

## 2022-07-18 RX ADMIN — Medication 975 MILLIGRAM(S): at 06:16

## 2022-07-18 NOTE — PROGRESS NOTE ADULT - ATTENDING COMMENTS
Patient doing well, no complaints, out of bed.   No HA, CP, SOB  No heavy vaginal bleeding (VB)/normal lochia    NAD  breast feeding  inc: intact, Dermabond  ext: mild edema, nontender    POD # 1 s/p repeat C/S and b/l tubal sterilization @ 37 wks for uncontrolled DM2 and h/o AMA HTN  pt doing well  Patient seen and evaluated by me. I agree with resident note unless otherwise stated.   Routine postpartum care, regular diet as tolerated, ambulate and pain control as needed.     INDIANA Zavala MD  Attending
AMA repeat C/S for prior T incision  w uncomplicated surgery and tubal sterilization, exacerbation of  anemia on oral Fe  doing well  pain control  BP and DM control  ambulation  breast feeding    ICU Vital Signs Last 24 Hrs  T(C): 37.2 (2022 13:34), Max: 37.2 (2022 13:34)  HR: 76 (2022 13:34) (75 - 86)  BP: 115/78 (2022 13:34) (111/72 - 136/86)  RR: 18 (2022 13:34) (18 - 18)  SpO2: 100% (2022 13:34) (99% - 100%)                          8.5    9.97  )-----------( 167      ( 2022 06:39 )             26.5       POD# 2 stable  Patient seen and evaluated by me. I agree with resident note unless otherwise stated.   Routine postpartum care, regular diet as tolerated, ambulate and pain control as needed.     INDIANA Zavala MD  Attending
Pt w no complaints    +OOB   +malini reg  VSS    /78- 117/79  Inc C/D/I  D/C home  Appreciate endo f/u-  cont metformin 1000mg BID  Cont labetalol  F/u 1wk  FeSO4 suppl

## 2022-07-18 NOTE — PROGRESS NOTE ADULT - PROBLEM SELECTOR PLAN 1
Increase OOB  PO Pain Protocol  Continue Regular Diet  Continue Routine Postop/Postpartum Care      Kasia Greer PA-C
Increase OOB  D/C IVF  Duramorph  Metformin 500 mg BID  Labetalol 100 mg BID  DVT ppx  garibay removed in PACU  Regular diet  AM CBC  Routine Postpartum/Post-op care
Increase OOB  Regular diet  PO Pain Protocol  Discharge Planning  Continue routine prenatal care

## 2022-07-18 NOTE — PROGRESS NOTE ADULT - SUBJECTIVE AND OBJECTIVE BOX
Day _1__ of Anesthesia Pain Management Service    SUBJECTIVE:  Pain Scale Score	At rest: ___2 	With Activity: ___4 	[ ] Refer to charted pain scores    THERAPY:    s/p ___0.1_____ mg PF morphine on 7/15__      MEDICATIONS  (STANDING):  acetaminophen     Tablet .. 975 milliGRAM(s) Oral <User Schedule>  ascorbic acid 500 milliGRAM(s) Oral daily  dextrose 5%. 1000 milliLiter(s) (50 mL/Hr) IV Continuous <Continuous>  dextrose 5%. 1000 milliLiter(s) (100 mL/Hr) IV Continuous <Continuous>  dextrose 50% Injectable 25 Gram(s) IV Push once  dextrose 50% Injectable 12.5 Gram(s) IV Push once  dextrose 50% Injectable 25 Gram(s) IV Push once  diphtheria/tetanus/pertussis (acellular) Vaccine (ADAcel) 0.5 milliLiter(s) IntraMuscular once  ferrous    sulfate 325 milliGRAM(s) Oral three times a day  glucagon  Injectable 1 milliGRAM(s) IntraMuscular once  heparin   Injectable 5000 Unit(s) SubCutaneous every 12 hours  ibuprofen  Tablet. 600 milliGRAM(s) Oral every 6 hours  insulin lispro (ADMELOG) corrective regimen sliding scale   SubCutaneous three times a day before meals  insulin lispro (ADMELOG) corrective regimen sliding scale   SubCutaneous at bedtime  labetalol 100 milliGRAM(s) Oral two times a day  lactated ringers. 1000 milliLiter(s) (125 mL/Hr) IV Continuous <Continuous>  metFORMIN 500 milliGRAM(s) Oral two times a day  oxytocin Infusion 333.333 milliUNIT(s)/Min (1000 mL/Hr) IV Continuous <Continuous>  oxytocin Infusion 333.333 milliUNIT(s)/Min (1000 mL/Hr) IV Continuous <Continuous>    MEDICATIONS  (PRN):  dextrose Oral Gel 15 Gram(s) Oral once PRN Blood Glucose LESS THAN 70 milliGRAM(s)/deciliter  diphenhydrAMINE 25 milliGRAM(s) Oral every 6 hours PRN Pruritus  lanolin Ointment 1 Application(s) Topical every 6 hours PRN Sore Nipples  magnesium hydroxide Suspension 30 milliLiter(s) Oral two times a day PRN Constipation  oxyCODONE    IR 5 milliGRAM(s) Oral every 3 hours PRN Moderate to Severe Pain (4-10)  oxyCODONE    IR 5 milliGRAM(s) Oral once PRN Moderate to Severe Pain (4-10)  simethicone 80 milliGRAM(s) Chew every 4 hours PRN Gas      OBJECTIVE:    Sedation Score:	[x ] Alert	[ ] Drowsy	[ ] Arousable	[ ] Asleep	[ ] Unresponsive    Side Effects:	[x ] None	[ ] Nausea	[ ] Vomiting	[ ] Pruritus  		  [ ] Weakness		[ ] Numbness	[ ] Other:    Vital Signs Last 24 Hrs  T(C): 36.6 (16 Jul 2022 09:17), Max: 37 (15 Jul 2022 21:00)  T(F): 97.9 (16 Jul 2022 09:17), Max: 98.6 (15 Jul 2022 21:00)  HR: 82 (16 Jul 2022 09:17) (68 - 88)  BP: 112/76 (16 Jul 2022 09:17) (105/70 - 124/75)  BP(mean): 91 (15 Jul 2022 15:10) (77 - 95)  RR: 18 (16 Jul 2022 09:17) (12 - 20)  SpO2: 99% (16 Jul 2022 09:17) (97% - 99%)    Parameters below as of 16 Jul 2022 05:50  Patient On (Oxygen Delivery Method): room air        ASSESSMENT/ PLAN  [ x] Patient transitioned to prn analgesics  [ x] Pain management per primary service, pain service to sign off   [ ]Documentation and Verification of current medications     Comments:
 Postpartum Note-  Section POD#1      Rubella IgG:      Immune                   RPR:                 Negative      Blood Type:      A+    S:Patient is a  36y G 4   P 4   POD#1 S/P   repeat C/Sec  Patient w/o complaints, pain is controlled.  Pt is OOB, tolerating PO, passing flatus. Lochia WNL.     Pt is Yazidism and has had Anesthesia Consult (22) and Heme Consult (Dr Ferrer 22) with completed Informed Consent For Blood Avoidance, Blood Refusal and Blood Management on chart. She refuses whole elements of blood, but consents to Category 1 Minor Blood Fractions, Category II Synthetic Protein Elements of Blood, Category III and Category IV Cell Saver (per Heme note only where extracorporeal circulation is a closed system without blood storage).  History of chronic hypertension, taking Labetalol 100 BID.    Additional history of T2DM, following previous pregnancy. Taking Metformin 500 TID and additional insulin in AM and PM, with Humalog before meals ?/?. Patient unsure of medication doses.    OBHx:   FT 7#11,   FT 7#10, 2018 pLTCS for breech w/ PPROM@36w 5#6 @ [place]      O:  Vital Signs Last 24 Hrs  T(C): 36.7 (2022 05:50), Max: 37 (15 Jul 2022 21:00)  T(F): 98.1 (2022 05:50), Max: 98.6 (15 Jul 2022 21:00)  HR: 75 (2022 05:50) (66 - 88)  BP: 113/74 (2022 05:50) (102/61 - 124/83)  BP(mean): 91 (15 Jul 2022 15:10) (77 - 98)  RR: 18 (2022 05:50) (12 - 31)  SpO2: 99% (2022 05:50) (97% - 100%)    Parameters below as of 2022 05:50  Patient On (Oxygen Delivery Method): room air      I&O's Summary    15 Jul 2022 07:01  -  2022 07:00  --------------------------------------------------------  IN: 3000 mL / OUT: 3999 mL / NET: -999 mL        Gen: NAD  CV: rrr s1s2, CTABL  Abdomen: Soft, nontender, non-distended, fundus firm.  Bowel sounds x 4 quadrants  Incision: Clean, dry, and intact.  Negative erythema/edema/ecchymosis   Sub Q  Lochia WNL  Ext: PAS in place. Negative Homans B/L.  Pedal pulses palpated B/L    LABS:                          8.5    9.97  )-----------( 167      ( 2022 06:39 )             26.5       A/P:  36y  POD # 1 S/P  repeat  section, doing well    PAST MEDICAL & SURGICAL HISTORY:  HTN (hypertension)      DM (diabetes mellitus)  Type 2 DM      Chronic back pain      Anemia      Refusal of blood transfusions as patient is Jehovah&#x27;s Witness      Lumbar disc herniation  h/o MARY      Labral tear of hip joint  left hip      COVID-19  2021 - mild case, did not require hospitalization or supplemental oxygen      H/O  section Inverted T incision.       H/O arthroscopy  3/8/2019 - left hip- for labral tear, which has since recurred        Current Issues:  1. cHTN currently on labetalol 100 mg BID, BPs well controlled.   2. Type 2 DM currently on metformin 500 mg BID prior to pregnancy, re-ordered this morning. Insulin sliding scale ordered, the patient received 1 unit of Amelog overnight post dinner with a POCT of 164mg/dl-> repeated 206 mg/dl overnight, therefore patient did not received additional insulin. The patient to have an endocrinology consult if insulin sliding utilized again.   3. Mild anemia prior to surgery 10.3/31.9-> repeated POD#1, found to be 8.5/26.5. Iron, Vitamin C ordered    Increase OOB  D/C IVF  Duramorph  Metformin 500 mg BID  Labetalol 100 mg BID  DVT ppx  garibay removed in PACU  Regular diet  AM CBC  Routine Postpartum/Post-op care          
Postpartum Note-  Section POD#2    Allergies    No Known Allergies    Intolerances    Blood type: A  Positive    RPR: Negative    Rubella: Immune    S: Patient is a  37yo        POD#2 S/P C/Sec  Subjective: Patient w/o complaints, pain is controlled.  Pt is OOB, tolerating PO, passing flatus, and voiding. Lochia WNL.     Feeding: Pumping    O:  Vital Signs Last 24 Hrs  T(C): 36.7 (2022 05:01), Max: 37.1 (2022 17:08)  T(F): 98.1 (2022 05:01), Max: 98.8 (2022 17:08)  HR: 86 (2022 05:01) (75 - 86)  BP: 136/86 (2022 05:01) (111/72 - 136/86)  RR: 18 (2022 05:01) (18 - 18)  SpO2: 99% (2022 05:01) (99% - 100%)      Gen: NAD  Abdomen: +BS, Soft, nontender, non distended, fundus firm.  Incision: Clean, dry, and intact.  Negative erythema/edema/ecchymosis.   SubQ/dermabond  Lochia WNL  Ext: Neg calf tenderness    LABS:                          8.5    9.97  )-----------( 167      ( 2022 06:39 )             26.5           
Postpartum Note-  Section POD#3    Allergies: No Known Allergies      Subjective: Patient w/o complaints, pain is controlled.  Pt is OOB, tolerating PO, passing flatus. Lochia WNL.     O:  Vital Signs Last 24 Hrs  T(C): 36.6 (2022 06:33), Max: 37.2 (2022 13:34)  T(F): 97.8 (2022 06:33), Max: 99 (2022 13:34)  HR: 76 (2022 06:33) (76 - 81)  BP: 128/78 (2022 06:33) (115/78 - 128/78)  BP(mean): --  RR: 18 (2022 06:33) (17 - 18)  SpO2: 98% (2022 06:33) (98% - 100%)    Parameters below as of 2022 06:33  Patient On (Oxygen Delivery Method): room air         Gen: NAD  Heart: S1S2 RRR  Lungs: CTA b/l  Abdomen: Soft, nontender, non-distended, fundus firm.  Incision: Clean, dry, and intact.  Negative erythema/edema/ecchymosis   Sub Q  Lochia WNL  Ext:  Neg Edema, Neg Calf tenderness    LABS:               8.5    9.97  )-----------( 167      ( 07-16 @ 06:39 )             26.5                10.3   7.06  )-----------( 199      ( 07-15 @ 07:05 )             31.9         PAST MEDICAL & SURGICAL HISTORY:  HTN (hypertension)      DM (diabetes mellitus)  Type 2 DM      Chronic back pain      Anemia      Refusal of blood transfusions as patient is Jehovah&#x27;s Witness      Lumbar disc herniation  h/o MARY      Labral tear of hip joint  left hip      COVID-19  2021 - mild case, did not require hospitalization or supplemental oxygen      H/O  section      H/O arthroscopy  3/8/2019 - left hip- for labral tear, which has since recurred        Current Issues: acute blood loss anemia secondary to operative delivery - hemodynamically stable- does not require transfusion

## 2022-07-18 NOTE — PROGRESS NOTE ADULT - ASSESSMENT
A/P:  36y       S/P  repeat  section    POD # 2, doing well    Current Issues: none  PAST MEDICAL & SURGICAL HISTORY:  HTN (hypertension)      DM (diabetes mellitus)  Type 2 DM      Chronic back pain      Anemia      Refusal of blood transfusions as patient is Liudmilavagretchen&#x27;s Witness      Lumbar disc herniation  h/o MARY      Labral tear of hip joint  left hip      COVID-19  2021 - mild case, did not require hospitalization or supplemental oxygen      H/O  section      H/O arthroscopy  3/8/2019 - left hip- for labral tear, which has since recurred
36y  POD # 1 S/P  repeat  section, doing well
A/P:  36y  POD # 3 S/P   section, doing well

## 2022-07-20 NOTE — OB RN PATIENT PROFILE - PRO PRENATAL LABS ORI SOURCE HBSAG
[facility] - Outpatient Rehabilitation and Therapy, [facility]  [address] [facility], [facility]  Phone: [phone]   Fax:     [phone]      Physical Therapy Treatment Note/ Progress Report:     Date:  2022    Patient Name:  Tommy Brown    :  1959  MRN: [id number]    Pertinent Medical History: asthma, lumbar DDD, DM, HTN, obesity, depression, B TKR    Medical/Treatment Diagnosis Information:  Diagnosis: M70.71 (ICD-10-CM) - Ischial bursitis of right side  Treatment Diagnosis: Decreased mobility, strength    Insurance/Certification information:  PT Insurance Information: Nancy Garcia thru [address]  Physician Information:  Referring Provider (secondary): SHLOMO Santos  Plan of care signed (Y/N): sent for cosign  (received)    Date of Patient follow up with Physician:      Progress Report: []  Yes  [x]  No     Date Range for reporting period:  Beginnin2022  Ending:      Progress report due (10 Rx/or 30 days whichever is less): 85    Recertification due (POC duration/ or 90 days whichever is less): 22    Visit # POC/Insurance Allowable Auth Needed  Thru ANGELO    8 visits approved  to 22  [x]Yes   []No     Latex Allergy:  [x]NO      []YES  Preferred Language for Healthcare:   [x]English       []Other:    Functional Scale:       Date assessed: at eval  Test:FOTO - hip  Score: 57    Pain level:  10     History of Injury: Pt reports onset of R buttock pain on and off for 5 years which she has been able to control by not sitting for prolonged periods. Pt notes she does aquatic exercise 3x/week on her own that she learned in previous rounds of therapy. Pt notes sitting in a chair is the main thing that aggravates her pain. If she sits side saddle relieving her R side she can sit longer. Also notes she does better with a more cushioned seat.   Pt gets relief of buttock pain with standing but notes she has some LBP with prolonged standing. Sitting tolerance approximately 15 mins. SUBJECTIVE:  7/6: Notes her pain has been a little bit better due to not sitting for prolonged periods. 7-11-22   Pain not much today due to has not been sitting. 7-13-22  Relief  After last Rx. Until yesterday afternoon when sitting too long. L shld/ hip sore from lying on it during massage. 7/20/22 Reports after sitting pain can still get intense, sitting tolerance 20 minutes; Reports she has been trying to walk with foot pointing forward instead of turned out; reports has been unable to do bridges at home; has been getting in pool at home to do exercises      OBJECTIVE:  Observation:   Palpation: pt point tender at R ischial tuberosity with moderate tenderness noted  Functional Mobility/Transfers: pt tends to sit side saddle with decreased Wbing through right buttock  Posture: slight forward flexed posture  Gait: (include devices/WB status) Pt ambulates with antalgic gait with bent knee and increased ER on R LE. Decreased heel strike noted.  Pt reports she ambulates this way due to R knee pain  Test measurements:    ROM:  Date              Hip Flexion Hip Extension Hip Abduction Hip IR Hip ER   Eval  7/1 WNL Min decreased  WNL Min decreased               Strength:  Date Hip flexion Hip abduction Hip Extension Hip IR Hip ER Quad Hamstring Ankle DF Ankle PF   Eval 7/1 5/5 4+/5 4/5  4+/5 4/5 5/5 5/5 5/5                   RESTRICTIONS/PRECAUTIONS:     Exercises/Interventions:     Therapeutic Ex (55824)   Min: 13 Reps/Resistance Notes/CUES   Seated Tband hip IR                         Hip ER                          HS curls Red 2x10 R  Red 2x10 R  Red 2x10 R         Standing prone over table hip extension 10x L/R    Standing hip IR 12x R ROM as dion so as not to aggravate R knee        SL clamshell Add     bridge 2x10  ROM as dion due to decreased R knee flex   Therapeutic Activity (05510) Min: 0                    NMR and down stairs     Home Exercise Program:    [] (57947) Reviewed/Progressed HEP activities related to strengthening, flexibility, endurance, ROM of core, proximal hip and LE for functional self-care, mobility, lifting and ambulation/stair navigation   [] (90617)Reviewed/Progressed HEP activities related to improving balance, coordination, kinesthetic sense, posture, motor skill, proprioception of core, proximal hip and LE for self care, mobility, lifting, and ambulation/stair navigation      Manual Treatments:  PROM / STM / Oscillations-Mobs:  G-I, II, III, IV (PA's, Inf., Post.)  [x] (73682) Provided manual therapy to mobilize LE, proximal hip and/or LS spine soft tissue/joints for the purpose of modulating pain, promoting relaxation,  increasing ROM, reducing/eliminating soft tissue swelling/inflammation/restriction, improving soft tissue extensibility and allowing for proper ROM for normal function with self care, mobility, lifting and ambulation. Charges:  Timed Code Treatment Minutes: 38   Total Treatment Minutes: 38      [] EVAL (LOW) 24858 (typically 20 minutes face-to-face)  [] EVAL (MOD) 78599 (typically 30 minutes face-to-face)  [] EVAL (HIGH) 83218 (typically 45 minutes face-to-face)  [] RE-EVAL     [x] EL(31209) x     [] Dry needle 1 or 2 Muscles (80078)  [] NMR (86458) x     [] Dry needle 3+ Muscles (40890)  [x] Manual (91611) x  2   [] Ultrasound (84874) x  [] TA (27103) x     [] Mech Traction (39216)  [] ES(attended) (09269)     [] ES (un) (22 [id number]):   [] Vasopump (26579) [] Ionto (63620)   [] Other:    GOALS:  Patient stated goal: \"Be able to sit upright longer\"  [] Progressing: [] Met: [] Not Met: [] Adjusted     Therapist goals for Patient:   Short Term Goals: To be achieved in: 2 weeks  1. Independent in HEP and progression per patient tolerance, in order to prevent re-injury. [] Progressing: [] Met: [] Not Met: [] Adjusted  2.  Patient will have a decrease in pain by 40% to facilitate improvement in movement, function, and ADLs as indicated by Functional Deficits. [] Progressing: [] Met: [] Not Met: [] Adjusted     Long Term Goals: To be achieved in: at discharge  1. Increase FOTO functional outcome score from 47 to 57 to assist with reaching prior level of function. [] Progressing: [] Met: [] Not Met: [] Adjusted  2. Patient will demonstrate an increase in R LE Strength to 5/5 to allow for proper functional mobility as indicated by patients Functional Deficits. [] Progressing: [] Met: [] Not Met: [] Adjusted  3. Patient will return to household duties without increased symptoms or restriction. [] Progressing: [] Met: [] Not Met: [] Adjusted  4. Patient will be able to sit for greater than 15 mins without increased symptoms or restriction. [] Progressing: [] Met: [] Not Met: [] Adjusted         ASSESSMENT:  Pt with good tolerance to PT session today. Less TTP noted R ischial tuberosity during STM. Discussed gait pattern and keeping R LE in a more neutral position to avoid overuse of hip. Pt instructed to use cane for prolonged ambulation to assist with offloading R knee pain and allow for improved gait pattern. 7-11-22 no c/o of  Pain after Rx. Mild TTP R ischial tuberosity. 7-13-22 deferred stretching/ STM on  no c/o of pain after Rx.   7-20-22 progressed glut strengthening without increased c/o pain. Patient would benefit from continued therapy to decrease pain and improve function. Treatment/Activity Tolerance:  [x] Patient tolerated treatment well [] Patient limited by fatique  [] Patient limited by pain  [] Patient limited by other medical complications  [] Other:     Overall Progression Towards Functional goals/ Treatment Progress Update:  [] Patient is progressing as expected towards functional goals listed. [] Progression is slowed due to complexities/Impairments listed. [] Progression has been slowed due to co-morbidities.   [x] Plan just implemented, too soon to assess goals progression <30days   [] Goals require adjustment due to lack of progress  [] Patient is not progressing as expected and requires additional follow up with physician  [] Other    Prognosis for POC: [x] Good [] Fair  [] Poor    Patient requires continued skilled intervention: [x] Yes  [] No      PLAN:   [x] Continue per plan of care [] Alter current plan (see comments)  [] Plan of care initiated [] Hold pending MD visit [] Discharge    Electronically signed by: Ling Green PT , OMT-C,  [id number]      Note: If patient does not return for scheduled/recommended follow up visits, this note will serve as a discharge from care along with the most recent update on progress. hard copy, drawn during this pregnancy

## 2022-07-22 ENCOUNTER — APPOINTMENT (OUTPATIENT)
Dept: OBGYN | Facility: CLINIC | Age: 37
End: 2022-07-22

## 2022-07-22 VITALS — DIASTOLIC BLOOD PRESSURE: 91 MMHG | SYSTOLIC BLOOD PRESSURE: 130 MMHG

## 2022-08-03 ENCOUNTER — APPOINTMENT (OUTPATIENT)
Dept: OBGYN | Facility: CLINIC | Age: 37
End: 2022-08-03

## 2022-08-03 VITALS
SYSTOLIC BLOOD PRESSURE: 128 MMHG | DIASTOLIC BLOOD PRESSURE: 90 MMHG | WEIGHT: 243 LBS | BODY MASS INDEX: 35.99 KG/M2 | HEIGHT: 69 IN

## 2022-08-03 DIAGNOSIS — Z86.2 PERSONAL HISTORY OF DISEASES OF THE BLOOD AND BLOOD-FORMING ORGANS AND CERTAIN DISORDERS INVOLVING THE IMMUNE MECHANISM: ICD-10-CM

## 2022-08-03 DIAGNOSIS — O99.013 ANEMIA COMPLICATING PREGNANCY, THIRD TRIMESTER: ICD-10-CM

## 2022-08-03 DIAGNOSIS — O36.1990 MATERNAL CARE FOR OTHER ISOIMMUNIZATION, UNSPECIFIED TRIMESTER, NOT APPLICABLE OR UNSPECIFIED: ICD-10-CM

## 2022-08-03 DIAGNOSIS — O09.523 SUPERVISION OF ELDERLY MULTIGRAVIDA, THIRD TRIMESTER: ICD-10-CM

## 2022-08-03 PROCEDURE — 0503F POSTPARTUM CARE VISIT: CPT

## 2022-08-03 RX ORDER — METFORMIN HYDROCHLORIDE 1000 MG/1
1000 TABLET, COATED ORAL
Qty: 60 | Refills: 0 | Status: ACTIVE | COMMUNITY
Start: 2022-07-17

## 2022-08-03 NOTE — HISTORY OF PRESENT ILLNESS
[Complications:___] : complications include: [unfilled] [Repeat C/S] : delivered by  section (repeat) [Breastfeeding] : currently nursing [Breast Pain] : no breast pain [Cracked Nipples] : no cracked nipples [S/Sx PP Depression] : no signs/symptoms of postpartum depression [Incisional Pain] : no incisional pain [Suicidal Ideation] : no suicidal ideation [Chills] : no chills [Dysuria] : no dysuria [Fever] : no fever [Headache] : no headache [Clean/Dry/Intact] : clean, dry and intact [Healed] : healed

## 2022-08-05 LAB — SURGICAL PATHOLOGY STUDY: SIGNIFICANT CHANGE UP

## 2022-09-22 ENCOUNTER — APPOINTMENT (OUTPATIENT)
Dept: OBGYN | Facility: CLINIC | Age: 37
End: 2022-09-22

## 2022-09-22 VITALS
HEIGHT: 69 IN | BODY MASS INDEX: 36.29 KG/M2 | SYSTOLIC BLOOD PRESSURE: 150 MMHG | DIASTOLIC BLOOD PRESSURE: 94 MMHG | WEIGHT: 245 LBS

## 2022-09-22 PROCEDURE — 0503F POSTPARTUM CARE VISIT: CPT

## 2022-09-22 NOTE — HISTORY OF PRESENT ILLNESS
[Complications:___] : complications include: [unfilled] [Repeat C/S] : delivered by  section (repeat) [Breast Pain] : no breast pain [Cracked Nipples] : no cracked nipples [S/Sx PP Depression] : no signs/symptoms of postpartum depression [Incisional Pain] : no incisional pain [Suicidal Ideation] : no suicidal ideation [Chills] : no chills [Dysuria] : no dysuria [Fever] : no fever [Headache] : no headache [Clean/Dry/Intact] : clean, dry and intact [Healed] : healed [Postpartum Follow Up] : postpartum follow up [Breastfeeding] : currently nursing [BF with Difficulty] : nursing without difficulty [Resumed Menses] : has resumed her menses [Resumed [place]] : has not resumed intercourse [Intended Contraception] : Intended Contraception: [de-identified] : breast pain, LMP now

## 2022-10-12 ENCOUNTER — APPOINTMENT (OUTPATIENT)
Dept: OPHTHALMOLOGY | Facility: CLINIC | Age: 37
End: 2022-10-12

## 2022-10-19 ENCOUNTER — NON-APPOINTMENT (OUTPATIENT)
Age: 37
End: 2022-10-19

## 2022-10-26 ENCOUNTER — APPOINTMENT (OUTPATIENT)
Dept: CARDIOLOGY | Facility: CLINIC | Age: 37
End: 2022-10-26

## 2022-10-26 ENCOUNTER — NON-APPOINTMENT (OUTPATIENT)
Age: 37
End: 2022-10-26

## 2022-10-26 VITALS — SYSTOLIC BLOOD PRESSURE: 120 MMHG | DIASTOLIC BLOOD PRESSURE: 82 MMHG

## 2022-10-26 VITALS
OXYGEN SATURATION: 99 % | DIASTOLIC BLOOD PRESSURE: 99 MMHG | HEART RATE: 71 BPM | HEIGHT: 69 IN | SYSTOLIC BLOOD PRESSURE: 150 MMHG

## 2022-10-26 PROCEDURE — 93000 ELECTROCARDIOGRAM COMPLETE: CPT

## 2022-10-26 PROCEDURE — 99214 OFFICE O/P EST MOD 30 MIN: CPT | Mod: 25

## 2022-10-26 NOTE — PHYSICAL EXAM
[Well Developed] : well developed [Well Nourished] : well nourished [No Acute Distress] : no acute distress [Normal Conjunctiva] : normal conjunctiva [Normal Venous Pressure] : normal venous pressure [No Carotid Bruit] : no carotid bruit [Normal S1, S2] : normal S1, S2 [No Rub] : no rub [No Gallop] : no gallop [Rhythm Regular] : regular [Normal S1] : normal S1 [Normal S2] : normal S2 [No Murmur] : no murmurs heard [Clear Lung Fields] : clear lung fields [Good Air Entry] : good air entry [No Respiratory Distress] : no respiratory distress  [Soft] : abdomen soft [Non Tender] : non-tender [No Masses/organomegaly] : no masses/organomegaly [Normal Bowel Sounds] : normal bowel sounds [Normal Gait] : normal gait [No Edema] : no edema [No Cyanosis] : no cyanosis [No Clubbing] : no clubbing [No Varicosities] : no varicosities [No Rash] : no rash [No Skin Lesions] : no skin lesions [Moves all extremities] : moves all extremities [No Focal Deficits] : no focal deficits [Normal Speech] : normal speech [Alert and Oriented] : alert and oriented [Normal memory] : normal memory

## 2022-10-28 NOTE — REVIEW OF SYSTEMS
[Joint Pain] : joint pain [Joint Stiffness] : joint stiffness [Myalgia] : myalgia [Negative] : Heme/Lymph [FreeTextEntry9] : Hip and back pain

## 2022-10-28 NOTE — DISCUSSION/SUMMARY
[With Me] : with me [___ Month(s)] : in [unfilled] month(s) [FreeTextEntry1] : Yenni has a history of hypertension, she is now 3 months post partum. \par She arrives in no acute distress.  she is euvolemic on exam.  ECG illustrates normal sinus rhythm, non specific ST variations.\par  Her exercise tolerance has generally been limited by her orthopedic issues, than by her respiratory and cardiac status. Her LV function is normal on recent echocardiogram. Her EKG today demonstrates a sinus rhythm without obvious ischemia or chamber enlargement. Her blood pressure is normal today.\par \par She will remain off of labetalol at this time.  However, for cardio/renal benefit, we discussed resuming an ACE or an ARB in the near future and after she is done breast feeding. \par She will continue to monitor her BP at home. She needs to stay hydrated and to continue to control her sugars. \par \par  At this time, from a cardiovascular perspective, she is optimized for the upcoming procedure with no cardiac contraindications. There is no evidence of active ischemic heart disease, decompensated heart failure, uncontrolled arrhythmia, or severe obstructive valvular disease. Routine hemodynamic monitoring will be sufficient.\par \par I stressed the importance of diet, exercise, and weight loss, to reduce her overall cardiovascular risk. \par I will see her again in 3 months, sooner if her blood pressure becomes an issue.

## 2022-10-28 NOTE — HISTORY OF PRESENT ILLNESS
[FreeTextEntry1] : Yenni is a 36 year old female here for follow up.\par \par I last saw her in 2022. Prior to this, she went to the emergency room with an episode of chest pain. \par Workup, including blood work and EKG were unremarkable. Her blood pressure was mildly elevated.\par \par She has a significant history of orthopedic issues/herniated discs, after a fall in 2018. She has been walking with a cane since. She also has a history of diabetes and hypertension. She has no family history of coronary artery disease. She denies toxic habits. \par \par Echocardiogram with normal LV function, mild concentric LV remodeling in , without change in .\par \par \par Ms Rueda presents today 10/26/22 for follow up of her blood pressure.  She is now 3 months postpartum.  She is in need of Hip surgery due to a workers comp injury.  She recently had pre op orthopedic evaluation recently, her blood pressure was elevated during the visit. She was instructed to followup with her cardiologist. \par \par She is currently off labetolol.  She was taken off during hospitalization after  because her blood pressure normalized.

## 2022-10-28 NOTE — REASON FOR VISIT
[Symptom and Test Evaluation] : symptom and test evaluation [FreeTextEntry1] : Previous visit history as stated below:\par \par She is feeling well. Her exercise tolerance has limited by her orthopedic issues.\par \par She is now 26 weeks pregnant. Her lisinopril was appropriately stopped. Her BP has been running fair at home. Her sugars have been higher, and she is now on insulin 5x/day.\par She reports episodes of palpitations and mild dyspnea on exertion. The palpitations have been more noticeable over the last few days.\par She has had some vision issues, with some mild dizziness.

## 2023-01-11 ENCOUNTER — NON-APPOINTMENT (OUTPATIENT)
Age: 38
End: 2023-01-11

## 2023-01-19 ENCOUNTER — APPOINTMENT (OUTPATIENT)
Dept: OPHTHALMOLOGY | Facility: CLINIC | Age: 38
End: 2023-01-19
Payer: COMMERCIAL

## 2023-01-19 ENCOUNTER — NON-APPOINTMENT (OUTPATIENT)
Age: 38
End: 2023-01-19

## 2023-01-19 PROCEDURE — 92014 COMPRE OPH EXAM EST PT 1/>: CPT

## 2023-01-30 ENCOUNTER — APPOINTMENT (OUTPATIENT)
Dept: CARDIOLOGY | Facility: CLINIC | Age: 38
End: 2023-01-30

## 2023-03-06 ENCOUNTER — APPOINTMENT (OUTPATIENT)
Dept: OBGYN | Facility: CLINIC | Age: 38
End: 2023-03-06
Payer: COMMERCIAL

## 2023-03-06 VITALS
WEIGHT: 246 LBS | HEIGHT: 69 IN | DIASTOLIC BLOOD PRESSURE: 85 MMHG | SYSTOLIC BLOOD PRESSURE: 120 MMHG | BODY MASS INDEX: 36.43 KG/M2

## 2023-03-06 DIAGNOSIS — E11.9 TYPE 2 DIABETES MELLITUS W/OUT COMPLICATIONS: ICD-10-CM

## 2023-03-06 DIAGNOSIS — Z01.419 ENCOUNTER FOR GYNECOLOGICAL EXAMINATION (GENERAL) (ROUTINE) W/OUT ABNORMAL FINDINGS: ICD-10-CM

## 2023-03-06 DIAGNOSIS — Z87.42 PERSONAL HISTORY OF OTHER DISEASES OF THE FEMALE GENITAL TRACT: ICD-10-CM

## 2023-03-06 PROCEDURE — 99395 PREV VISIT EST AGE 18-39: CPT

## 2023-03-06 NOTE — HISTORY OF PRESENT ILLNESS
[FreeTextEntry1] : 36yo  P4 LMP 2/13 here for  annual exam\par \par vaginal area odor days before menses, resolves once menses starts\par \par heavily menses on day 1-2 and normal from day 3-6--chronic\par \par s/p BTL\par \par recent cholecystectomy\par \par struggling w DM 2, seeing endo

## 2023-03-06 NOTE — PLAN
[FreeTextEntry1] : well woman\par \par Fe supplment for menses\par \par odor likely musk--Lumen\par bv test to rule out

## 2023-03-20 LAB
CANDIDA VAG CYTO: NOT DETECTED
CYTOLOGY CVX/VAG DOC THIN PREP: NORMAL
G VAGINALIS+PREV SP MTYP VAG QL MICRO: NOT DETECTED
HPV HIGH+LOW RISK DNA PNL CVX: NOT DETECTED
T VAGINALIS VAG QL WET PREP: NOT DETECTED

## 2023-03-20 NOTE — OB RN INTRAOPERATIVE NOTE - NS_CULTURES_OBGYN_ALL_OB
Per Dr. Burns patient needs monthly HCV RNA/HBV DNA/Hep B s AG/AB and follow up in 4 months instead of 6 months. Contacted patient and relayed above. He verbalized understanding.   
No

## 2023-06-22 NOTE — DISCHARGE NOTE OB - AVOID SITTING IN ONE POSITION FOR MORE THAN ONE HOUR
Statement Selected
Patient brought in by aide for skin breakdown to bilateral hands from her "sucking on them". Pt is blind and deaf

## 2024-02-23 ENCOUNTER — NON-APPOINTMENT (OUTPATIENT)
Age: 39
End: 2024-02-23

## 2024-03-07 ENCOUNTER — APPOINTMENT (OUTPATIENT)
Dept: OBGYN | Facility: CLINIC | Age: 39
End: 2024-03-07
Payer: COMMERCIAL

## 2024-03-07 DIAGNOSIS — R10.2 PELVIC AND PERINEAL PAIN: ICD-10-CM

## 2024-03-07 DIAGNOSIS — E04.1 NONTOXIC SINGLE THYROID NODULE: ICD-10-CM

## 2024-03-07 DIAGNOSIS — K43.9 VENTRAL HERNIA W/OUT OBSTRUCTION OR GANGRENE: ICD-10-CM

## 2024-03-07 DIAGNOSIS — Z01.419 ENCOUNTER FOR GYNECOLOGICAL EXAMINATION (GENERAL) (ROUTINE) W/OUT ABNORMAL FINDINGS: ICD-10-CM

## 2024-03-07 PROCEDURE — 99395 PREV VISIT EST AGE 18-39: CPT

## 2024-03-07 NOTE — PLAN
[FreeTextEntry1] : annual exam   suspect hernia pt will f/u w surgeon but will get imaging now  change in cycle, heavy and pain sono  boric acid for odor

## 2024-03-07 NOTE — REVIEW OF SYSTEMS
[Abdominal Pain] : abdominal pain [Abn Vaginal bleeding] : no abnormal vaginal bleeding [Pelvic pain] : pelvic pain [Negative] : Heme/Lymph

## 2024-03-07 NOTE — HISTORY OF PRESENT ILLNESS
[FreeTextEntry1] : 37yo  P4 LMP here for annual exam   salpingectomy for BC DM 2  since 10/2024--menses heavier and pain  (RLQ) starting 2 days prior to menses  area around umbilicus tender to touch w bulge, no F/C no nausea but whole abd cramping when pain full no dysuria no irreg bleeding  odor day prior to menses

## 2024-03-07 NOTE — PHYSICAL EXAM
[Chaperone Declined] : Patient declined chaperone [Appropriately responsive] : appropriately responsive [Alert] : alert [No Acute Distress] : no acute distress [Goiter] : goiter [No Lymphadenopathy] : no lymphadenopathy [Soft] : soft [Non-distended] : non-distended [No HSM] : No HSM [No Mass] : no mass [Oriented x3] : oriented x3 [FreeTextEntry7] : right of umbilicus 5+ cm tender soft area [Examination Of The Breasts] : a normal appearance [No Masses] : no breast masses were palpable [Labia Majora] : normal [Labia Minora] : normal [Normal] : normal [Tenderness] : nontender [Uterine Adnexae] : normal

## 2024-03-08 LAB
C TRACH RRNA SPEC QL NAA+PROBE: NOT DETECTED
HPV HIGH+LOW RISK DNA PNL CVX: NOT DETECTED
N GONORRHOEA RRNA SPEC QL NAA+PROBE: NOT DETECTED
SOURCE AMPLIFICATION: NORMAL

## 2024-03-26 ENCOUNTER — APPOINTMENT (OUTPATIENT)
Dept: CT IMAGING | Facility: CLINIC | Age: 39
End: 2024-03-26
Payer: COMMERCIAL

## 2024-03-26 ENCOUNTER — APPOINTMENT (OUTPATIENT)
Dept: ULTRASOUND IMAGING | Facility: CLINIC | Age: 39
End: 2024-03-26
Payer: COMMERCIAL

## 2024-03-26 ENCOUNTER — OUTPATIENT (OUTPATIENT)
Dept: OUTPATIENT SERVICES | Facility: HOSPITAL | Age: 39
LOS: 1 days | End: 2024-03-26
Payer: COMMERCIAL

## 2024-03-26 DIAGNOSIS — Z98.891 HISTORY OF UTERINE SCAR FROM PREVIOUS SURGERY: Chronic | ICD-10-CM

## 2024-03-26 DIAGNOSIS — K43.9 VENTRAL HERNIA WITHOUT OBSTRUCTION OR GANGRENE: ICD-10-CM

## 2024-03-26 DIAGNOSIS — R10.2 PELVIC AND PERINEAL PAIN: ICD-10-CM

## 2024-03-26 DIAGNOSIS — Z98.890 OTHER SPECIFIED POSTPROCEDURAL STATES: Chronic | ICD-10-CM

## 2024-03-26 PROCEDURE — 74177 CT ABD & PELVIS W/CONTRAST: CPT | Mod: 26

## 2024-03-26 PROCEDURE — 76830 TRANSVAGINAL US NON-OB: CPT

## 2024-03-26 PROCEDURE — 76830 TRANSVAGINAL US NON-OB: CPT | Mod: 26

## 2024-03-26 PROCEDURE — 74177 CT ABD & PELVIS W/CONTRAST: CPT

## 2024-03-31 LAB — CYTOLOGY CVX/VAG DOC THIN PREP: NORMAL

## 2024-04-17 NOTE — OB PST NOTE - PROBLEM SELECTOR PLAN 3
Problem: Discharge Planning  Goal: Discharge to home or other facility with appropriate resources  4/17/2024 0934 by Lakshmi Knowles, RN  Outcome: Progressing  4/16/2024 1955 by Mj Lisa RN  Outcome: Progressing  Flowsheets (Taken 4/16/2024 0800 by Ella Marcelino, RN)  Discharge to home or other facility with appropriate resources:   Identify barriers to discharge with patient and caregiver   Arrange for needed discharge resources and transportation as appropriate   Identify discharge learning needs (meds, wound care, etc)     Problem: Safety - Adult  Goal: Free from fall injury  4/17/2024 0934 by Lakshmi Knowles, RN  Outcome: Progressing  4/16/2024 1955 by Mj Lisa RN  Outcome: Progressing     Problem: Chronic Conditions and Co-morbidities  Goal: Patient's chronic conditions and co-morbidity symptoms are monitored and maintained or improved  Outcome: Progressing     Problem: Respiratory - Adult  Goal: Achieves optimal ventilation and oxygenation  Outcome: Progressing     Problem: Cardiovascular - Adult  Goal: Maintains optimal cardiac output and hemodynamic stability  Outcome: Progressing  Goal: Absence of cardiac dysrhythmias or at baseline  Outcome: Progressing     Problem: Pain  Goal: Verbalizes/displays adequate comfort level or baseline comfort level  Outcome: Progressing     Problem: Nutrition Deficit:  Goal: Optimize nutritional status  Outcome: Progressing     Problem: Skin/Tissue Integrity  Goal: Absence of new skin breakdown  Description: 1.  Monitor for areas of redness and/or skin breakdown  2.  Assess vascular access sites hourly  3.  Every 4-6 hours minimum:  Change oxygen saturation probe site  4.  Every 4-6 hours:  If on nasal continuous positive airway pressure, respiratory therapy assess nares and determine need for appliance change or resting period.  Outcome: Progressing      Stat FS on admission  Pt instructed to hold metformin 24 hours preop and hold Humalog on day of surgery  Pt will follow up with MFM/OB at visit today for specific instructions for Lantus on day prior to and day of surgery

## 2024-05-10 ENCOUNTER — EMERGENCY (EMERGENCY)
Facility: HOSPITAL | Age: 39
LOS: 0 days | Discharge: HOME | End: 2024-05-10
Attending: EMERGENCY MEDICINE
Payer: COMMERCIAL

## 2024-05-10 VITALS
DIASTOLIC BLOOD PRESSURE: 75 MMHG | RESPIRATION RATE: 26 BRPM | HEART RATE: 118 BPM | TEMPERATURE: 99 F | OXYGEN SATURATION: 100 % | WEIGHT: 25.79 LBS | SYSTOLIC BLOOD PRESSURE: 89 MMHG

## 2024-05-10 VITALS
HEART RATE: 84 BPM | SYSTOLIC BLOOD PRESSURE: 128 MMHG | DIASTOLIC BLOOD PRESSURE: 97 MMHG | RESPIRATION RATE: 18 BRPM | TEMPERATURE: 99 F | OXYGEN SATURATION: 100 %

## 2024-05-10 DIAGNOSIS — M54.50 LOW BACK PAIN, UNSPECIFIED: ICD-10-CM

## 2024-05-10 DIAGNOSIS — M54.2 CERVICALGIA: ICD-10-CM

## 2024-05-10 DIAGNOSIS — Z98.891 HISTORY OF UTERINE SCAR FROM PREVIOUS SURGERY: Chronic | ICD-10-CM

## 2024-05-10 DIAGNOSIS — Z98.890 OTHER SPECIFIED POSTPROCEDURAL STATES: Chronic | ICD-10-CM

## 2024-05-10 DIAGNOSIS — G89.29 OTHER CHRONIC PAIN: ICD-10-CM
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
## 2024-05-10 DIAGNOSIS — M25.552 PAIN IN LEFT HIP: ICD-10-CM
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
## 2024-05-10 DIAGNOSIS — D64.9 ANEMIA, UNSPECIFIED: ICD-10-CM

## 2024-05-10 LAB
ALBUMIN SERPL ELPH-MCNC: 3.5 G/DL — SIGNIFICANT CHANGE UP (ref 3.3–5)
ALP SERPL-CCNC: 84 U/L — SIGNIFICANT CHANGE UP (ref 40–120)
ALT FLD-CCNC: 21 U/L — SIGNIFICANT CHANGE UP (ref 12–78)
ANION GAP SERPL CALC-SCNC: 6 MMOL/L — SIGNIFICANT CHANGE UP (ref 5–17)
APPEARANCE UR: CLEAR — SIGNIFICANT CHANGE UP
APTT BLD: 32.3 SEC — SIGNIFICANT CHANGE UP (ref 24.5–35.6)
AST SERPL-CCNC: 12 U/L — LOW (ref 15–37)
BASOPHILS # BLD AUTO: 0.07 K/UL — SIGNIFICANT CHANGE UP (ref 0–0.2)
BASOPHILS NFR BLD AUTO: 0.8 % — SIGNIFICANT CHANGE UP (ref 0–2)
BILIRUB SERPL-MCNC: 0.3 MG/DL — SIGNIFICANT CHANGE UP (ref 0.2–1.2)
BILIRUB UR-MCNC: NEGATIVE — SIGNIFICANT CHANGE UP
BUN SERPL-MCNC: 9 MG/DL — SIGNIFICANT CHANGE UP (ref 7–23)
CALCIUM SERPL-MCNC: 8.8 MG/DL — SIGNIFICANT CHANGE UP (ref 8.5–10.1)
CHLORIDE SERPL-SCNC: 109 MMOL/L — HIGH (ref 96–108)
CO2 SERPL-SCNC: 24 MMOL/L — SIGNIFICANT CHANGE UP (ref 22–31)
COLOR SPEC: YELLOW — SIGNIFICANT CHANGE UP
CREAT SERPL-MCNC: 0.59 MG/DL — SIGNIFICANT CHANGE UP (ref 0.5–1.3)
DIFF PNL FLD: ABNORMAL
EGFR: 118 ML/MIN/1.73M2 — SIGNIFICANT CHANGE UP
EOSINOPHIL # BLD AUTO: 0.06 K/UL — SIGNIFICANT CHANGE UP (ref 0–0.5)
EOSINOPHIL NFR BLD AUTO: 0.7 % — SIGNIFICANT CHANGE UP (ref 0–6)
GLUCOSE SERPL-MCNC: 99 MG/DL — SIGNIFICANT CHANGE UP (ref 70–99)
GLUCOSE UR QL: NEGATIVE MG/DL — SIGNIFICANT CHANGE UP
HCG SERPL-ACNC: <1 MIU/ML — SIGNIFICANT CHANGE UP
HCT VFR BLD CALC: 30.4 % — LOW (ref 34.5–45)
HCV AB S/CO SERPL IA: 0.39 S/CO — SIGNIFICANT CHANGE UP (ref 0–0.99)
HCV AB SERPL-IMP: SIGNIFICANT CHANGE UP
HGB BLD-MCNC: 9.2 G/DL — LOW (ref 11.5–15.5)
HIV 1 & 2 AB SERPL IA.RAPID: SIGNIFICANT CHANGE UP
IMM GRANULOCYTES NFR BLD AUTO: 0.2 % — SIGNIFICANT CHANGE UP (ref 0–0.9)
INR BLD: 1.16 RATIO — SIGNIFICANT CHANGE UP (ref 0.85–1.18)
KETONES UR-MCNC: NEGATIVE MG/DL — SIGNIFICANT CHANGE UP
LEUKOCYTE ESTERASE UR-ACNC: NEGATIVE — SIGNIFICANT CHANGE UP
LIDOCAIN IGE QN: 28 U/L — SIGNIFICANT CHANGE UP (ref 13–75)
LYMPHOCYTES # BLD AUTO: 1.84 K/UL — SIGNIFICANT CHANGE UP (ref 1–3.3)
LYMPHOCYTES # BLD AUTO: 21.4 % — SIGNIFICANT CHANGE UP (ref 13–44)
MAGNESIUM SERPL-MCNC: 2.3 MG/DL — SIGNIFICANT CHANGE UP (ref 1.6–2.6)
MCHC RBC-ENTMCNC: 21 PG — LOW (ref 27–34)
MCHC RBC-ENTMCNC: 30.3 GM/DL — LOW (ref 32–36)
MCV RBC AUTO: 69.4 FL — LOW (ref 80–100)
MONOCYTES # BLD AUTO: 0.47 K/UL — SIGNIFICANT CHANGE UP (ref 0–0.9)
MONOCYTES NFR BLD AUTO: 5.5 % — SIGNIFICANT CHANGE UP (ref 2–14)
NEUTROPHILS # BLD AUTO: 6.14 K/UL — SIGNIFICANT CHANGE UP (ref 1.8–7.4)
NEUTROPHILS NFR BLD AUTO: 71.4 % — SIGNIFICANT CHANGE UP (ref 43–77)
NITRITE UR-MCNC: NEGATIVE — SIGNIFICANT CHANGE UP
NT-PROBNP SERPL-SCNC: 62 PG/ML — SIGNIFICANT CHANGE UP (ref 0–125)
PH UR: 7 — SIGNIFICANT CHANGE UP (ref 5–8)
PLATELET # BLD AUTO: 309 K/UL — SIGNIFICANT CHANGE UP (ref 150–400)
POTASSIUM SERPL-MCNC: 3.8 MMOL/L — SIGNIFICANT CHANGE UP (ref 3.5–5.3)
POTASSIUM SERPL-SCNC: 3.8 MMOL/L — SIGNIFICANT CHANGE UP (ref 3.5–5.3)
PROT SERPL-MCNC: 7.8 GM/DL — SIGNIFICANT CHANGE UP (ref 6–8.3)
PROT UR-MCNC: NEGATIVE MG/DL — SIGNIFICANT CHANGE UP
PROTHROM AB SERPL-ACNC: 13 SEC — SIGNIFICANT CHANGE UP (ref 9.5–13)
RBC # BLD: 4.38 M/UL — SIGNIFICANT CHANGE UP (ref 3.8–5.2)
RBC # FLD: 18.6 % — HIGH (ref 10.3–14.5)
SODIUM SERPL-SCNC: 139 MMOL/L — SIGNIFICANT CHANGE UP (ref 135–145)
SP GR SPEC: >1.03 — HIGH (ref 1–1.03)
TROPONIN I, HIGH SENSITIVITY RESULT: 5.8 NG/L — SIGNIFICANT CHANGE UP
UROBILINOGEN FLD QL: 0.2 MG/DL — SIGNIFICANT CHANGE UP (ref 0.2–1)
WBC # BLD: 8.6 K/UL — SIGNIFICANT CHANGE UP (ref 3.8–10.5)
WBC # FLD AUTO: 8.6 K/UL — SIGNIFICANT CHANGE UP (ref 3.8–10.5)

## 2024-05-10 PROCEDURE — 85025 COMPLETE CBC W/AUTO DIFF WBC: CPT

## 2024-05-10 PROCEDURE — 86803 HEPATITIS C AB TEST: CPT

## 2024-05-10 PROCEDURE — 74177 CT ABD & PELVIS W/CONTRAST: CPT | Mod: MC

## 2024-05-10 PROCEDURE — 70450 CT HEAD/BRAIN W/O DYE: CPT | Mod: 26,MC

## 2024-05-10 PROCEDURE — 72125 CT NECK SPINE W/O DYE: CPT | Mod: 26,MC

## 2024-05-10 PROCEDURE — 86880 COOMBS TEST DIRECT: CPT

## 2024-05-10 PROCEDURE — 83690 ASSAY OF LIPASE: CPT

## 2024-05-10 PROCEDURE — 86901 BLOOD TYPING SEROLOGIC RH(D): CPT

## 2024-05-10 PROCEDURE — 72129 CT CHEST SPINE W/DYE: CPT | Mod: 26,MC

## 2024-05-10 PROCEDURE — 99285 EMERGENCY DEPT VISIT HI MDM: CPT

## 2024-05-10 PROCEDURE — 85730 THROMBOPLASTIN TIME PARTIAL: CPT

## 2024-05-10 PROCEDURE — 71260 CT THORAX DX C+: CPT | Mod: 26,MC

## 2024-05-10 PROCEDURE — 86077 PHYS BLOOD BANK SERV XMATCH: CPT

## 2024-05-10 PROCEDURE — 36415 COLL VENOUS BLD VENIPUNCTURE: CPT

## 2024-05-10 PROCEDURE — 74177 CT ABD & PELVIS W/CONTRAST: CPT | Mod: 26,MC

## 2024-05-10 PROCEDURE — 72132 CT LUMBAR SPINE W/DYE: CPT | Mod: 26,MC

## 2024-05-10 PROCEDURE — 80053 COMPREHEN METABOLIC PANEL: CPT

## 2024-05-10 PROCEDURE — 86850 RBC ANTIBODY SCREEN: CPT

## 2024-05-10 PROCEDURE — 70450 CT HEAD/BRAIN W/O DYE: CPT | Mod: MC

## 2024-05-10 PROCEDURE — 85610 PROTHROMBIN TIME: CPT

## 2024-05-10 PROCEDURE — 87086 URINE CULTURE/COLONY COUNT: CPT

## 2024-05-10 PROCEDURE — 72125 CT NECK SPINE W/O DYE: CPT | Mod: MC

## 2024-05-10 PROCEDURE — 84702 CHORIONIC GONADOTROPIN TEST: CPT

## 2024-05-10 PROCEDURE — 93010 ELECTROCARDIOGRAM REPORT: CPT

## 2024-05-10 PROCEDURE — 93005 ELECTROCARDIOGRAM TRACING: CPT

## 2024-05-10 PROCEDURE — 99285 EMERGENCY DEPT VISIT HI MDM: CPT | Mod: 25

## 2024-05-10 PROCEDURE — 86900 BLOOD TYPING SEROLOGIC ABO: CPT

## 2024-05-10 PROCEDURE — 83880 ASSAY OF NATRIURETIC PEPTIDE: CPT

## 2024-05-10 PROCEDURE — 71260 CT THORAX DX C+: CPT | Mod: MC

## 2024-05-10 PROCEDURE — 86870 RBC ANTIBODY IDENTIFICATION: CPT

## 2024-05-10 PROCEDURE — 81001 URINALYSIS AUTO W/SCOPE: CPT

## 2024-05-10 PROCEDURE — 86703 HIV-1/HIV-2 1 RESULT ANTBDY: CPT

## 2024-05-10 PROCEDURE — 84484 ASSAY OF TROPONIN QUANT: CPT

## 2024-05-10 PROCEDURE — 96374 THER/PROPH/DIAG INJ IV PUSH: CPT | Mod: XU

## 2024-05-10 PROCEDURE — 83735 ASSAY OF MAGNESIUM: CPT

## 2024-05-10 RX ORDER — CYCLOBENZAPRINE HYDROCHLORIDE 10 MG/1
1 TABLET, FILM COATED ORAL
Qty: 15 | Refills: 0
Start: 2024-05-10 | End: 2024-05-14

## 2024-05-10 RX ORDER — IBUPROFEN 200 MG
1 TABLET ORAL
Qty: 20 | Refills: 0
Start: 2024-05-10 | End: 2024-05-14

## 2024-05-10 RX ORDER — ACETAMINOPHEN 500 MG
1000 TABLET ORAL ONCE
Refills: 0 | Status: COMPLETED | OUTPATIENT
Start: 2024-05-10 | End: 2024-05-10

## 2024-05-10 RX ADMIN — Medication 400 MILLIGRAM(S): at 13:02
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
## 2024-05-10 NOTE — ED STATDOCS - CARE PROVIDER_API CALL
Karyn Anderson  Orthopaedic Surgery  [address], [address]  [place] [address]  Phone: [phone]  Fax: [phone]  Follow Up Time:     Bowen Mosqueda  Orthopaedic Surgery  [address]  [place] [address]  Phone: [phone]  Fax: [phone]  Follow Up Time:
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
## 2024-05-10 NOTE — ED ADULT NURSE NOTE - NS ED NURSE IV DC DT
On Monday April 25th, I offered a stress test appointment for April 26th at [facility]- mom stated they were unable to make a stress test appointment for 4/26/2022.  Called placed today, voicemail left for patient's mom attempting to schedule a stress test for another date.  I provided my phone number to discuss possible dates for his stress test.  
10-May-2024 16:06
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
## 2024-05-10 NOTE — ED STATDOCS - ATTENDING CONTRIBUTION TO CARE
IChriss MD, personally saw the patient with the resident, and completed the key components of the history and physical exam. I then discussed the management plan with the resident.     IChriss MD, personally saw the patient with ARMAND.  I have personally performed a face to face diagnostic evaluation on this patient.  I have reviewed the ARMAND note and agree with the history, exam, and plan of care, except as noted.  I personally saw the patient and performed a substantive portion of the visit including all aspects of the medical decision making.

## 2024-05-10 NOTE — ED STATDOCS - NS ED ATTENDING STATEMENT MOD
I have seen and examined this patient and fully participated in the care of this patient as the teaching attending.  The service was shared with the ARMAND.  I reviewed and verified the documentation.

## 2024-05-10 NOTE — ED STATDOCS - PATIENT PORTAL LINK FT
You can access the FollowMyHealth Patient Portal offered by [facility] by registering at the following website: [email or web address]. By joining [facility]’s FollowMyHealth portal, you will also be able to view your health information using other applications (apps) compatible with our system.

## 2024-05-10 NOTE — ED STATDOCS - CARE PROVIDERS DIRECT ADDRESSES
[email or web address]
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
## 2024-05-10 NOTE — ED STATDOCS - NSICDXPASTMEDICALHX_GEN_ALL_CORE_FT
PAST MEDICAL HISTORY:  Anemia     Chronic back pain     COVID-19 12/2021 - mild case, did not require hospitalization or supplemental oxygen    DM (diabetes mellitus) Type 2 DM    HTN (hypertension)     Labral tear of hip joint left hip    Lumbar disc herniation h/o MARY    Refusal of blood transfusions as patient is Mandaen

## 2024-05-12 LAB
CULTURE RESULTS: SIGNIFICANT CHANGE UP
SPECIMEN SOURCE: SIGNIFICANT CHANGE UP

## 2024-05-14 ENCOUNTER — APPOINTMENT (OUTPATIENT)
Dept: ORTHOPEDIC SURGERY | Facility: CLINIC | Age: 39
End: 2024-05-14
Payer: COMMERCIAL

## 2024-05-14 VITALS
DIASTOLIC BLOOD PRESSURE: 84 MMHG | BODY MASS INDEX: 36.73 KG/M2 | HEIGHT: 69 IN | WEIGHT: 248 LBS | SYSTOLIC BLOOD PRESSURE: 130 MMHG | HEART RATE: 83 BPM

## 2024-05-14 DIAGNOSIS — M70.61 TROCHANTERIC BURSITIS, RIGHT HIP: ICD-10-CM

## 2024-05-14 DIAGNOSIS — Z87.39 PERSONAL HISTORY OF OTHER DISEASES OF THE MUSCULOSKELETAL SYSTEM AND CONNECTIVE TISSUE: ICD-10-CM

## 2024-05-14 PROCEDURE — 99204 OFFICE O/P NEW MOD 45 MIN: CPT

## 2024-05-14 NOTE — PHYSICAL EXAM
[de-identified] : Right hip Normal range of motion, no pain with deep flexion internal rotation No pain with resisted flexion, abduction, adduction Negative straight leg raise Mildly tender palpation over the lateral troch and and buttock [de-identified] : Imaging at hospital reviewed-right hip appears with joint space well-maintained, no abnormalities
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
## 2025-05-23 NOTE — OB RN INTRAOPERATIVE NOTE - NSRNPREP_OBGYN_ALL_OB
Called to assist patient with scheduling surgery with provider. Due to an extended time frame in providers schedule I have offered patient to have surgery with another provider. Patient would like to have it with  and in the [facility] location. I Have sent a secure chat to  due to an extended period of time the patient will have to wait for surgery to see if he is okay with patient waiting till August. I will call patient back as soon as I hear from provider   Yes

## 2025-05-27 ENCOUNTER — NON-APPOINTMENT (OUTPATIENT)
Age: 40
End: 2025-05-27

## 2025-05-27 ENCOUNTER — APPOINTMENT (OUTPATIENT)
Dept: OBGYN | Facility: CLINIC | Age: 40
End: 2025-05-27

## 2025-05-27 VITALS
SYSTOLIC BLOOD PRESSURE: 145 MMHG | DIASTOLIC BLOOD PRESSURE: 88 MMHG | WEIGHT: 244 LBS | BODY MASS INDEX: 36.14 KG/M2 | HEIGHT: 69 IN

## 2025-05-27 DIAGNOSIS — Z11.3 ENCOUNTER FOR SCREENING FOR INFECTIONS WITH A PREDOMINANTLY SEXUAL MODE OF TRANSMISSION: ICD-10-CM

## 2025-05-27 DIAGNOSIS — Z13.31 ENCOUNTER FOR SCREENING FOR DEPRESSION: ICD-10-CM

## 2025-05-27 DIAGNOSIS — Z12.39 ENCOUNTER FOR OTHER SCREENING FOR MALIGNANT NEOPLASM OF BREAST: ICD-10-CM

## 2025-05-27 DIAGNOSIS — Z12.4 ENCOUNTER FOR SCREENING FOR MALIGNANT NEOPLASM OF CERVIX: ICD-10-CM

## 2025-05-27 DIAGNOSIS — Z01.419 ENCOUNTER FOR GYNECOLOGICAL EXAMINATION (GENERAL) (ROUTINE) W/OUT ABNORMAL FINDINGS: ICD-10-CM

## 2025-05-27 PROCEDURE — G0444 DEPRESSION SCREEN ANNUAL: CPT | Mod: 59

## 2025-05-27 PROCEDURE — 99459 PELVIC EXAMINATION: CPT

## 2025-05-27 PROCEDURE — 99396 PREV VISIT EST AGE 40-64: CPT

## 2025-06-02 LAB
BV BACTERIA RRNA VAG QL NAA+PROBE: NOT DETECTED
C GLABRATA RNA VAG QL NAA+PROBE: NOT DETECTED
C TRACH RRNA SPEC QL NAA+PROBE: NOT DETECTED
CANDIDA RRNA VAG QL PROBE: NOT DETECTED
CYTOLOGY CVX/VAG DOC THIN PREP: NORMAL
HPV HIGH+LOW RISK DNA PNL CVX: NOT DETECTED
N GONORRHOEA RRNA SPEC QL NAA+PROBE: NOT DETECTED
T VAGINALIS RRNA SPEC QL NAA+PROBE: NOT DETECTED

## 2025-09-01 ENCOUNTER — EMERGENCY (EMERGENCY)
Facility: HOSPITAL | Age: 40
LOS: 1 days | End: 2025-09-01
Attending: EMERGENCY MEDICINE | Admitting: EMERGENCY MEDICINE
Payer: COMMERCIAL

## 2025-09-01 VITALS
RESPIRATION RATE: 16 BRPM | HEIGHT: 69 IN | DIASTOLIC BLOOD PRESSURE: 81 MMHG | TEMPERATURE: 98 F | WEIGHT: 255.07 LBS | HEART RATE: 92 BPM | OXYGEN SATURATION: 100 % | SYSTOLIC BLOOD PRESSURE: 147 MMHG

## 2025-09-01 VITALS
RESPIRATION RATE: 18 BRPM | OXYGEN SATURATION: 98 % | HEART RATE: 101 BPM | SYSTOLIC BLOOD PRESSURE: 133 MMHG | TEMPERATURE: 99 F | DIASTOLIC BLOOD PRESSURE: 97 MMHG

## 2025-09-01 DIAGNOSIS — Z98.890 OTHER SPECIFIED POSTPROCEDURAL STATES: Chronic | ICD-10-CM

## 2025-09-01 DIAGNOSIS — Z98.891 HISTORY OF UTERINE SCAR FROM PREVIOUS SURGERY: Chronic | ICD-10-CM

## 2025-09-01 PROCEDURE — 99284 EMERGENCY DEPT VISIT MOD MDM: CPT | Mod: 25

## 2025-09-01 PROCEDURE — 96375 TX/PRO/DX INJ NEW DRUG ADDON: CPT

## 2025-09-01 PROCEDURE — 96374 THER/PROPH/DIAG INJ IV PUSH: CPT

## 2025-09-01 PROCEDURE — 99291 CRITICAL CARE FIRST HOUR: CPT

## 2025-09-01 RX ORDER — PREDNISONE 20 MG/1
1 TABLET ORAL
Qty: 4 | Refills: 0
Start: 2025-09-01 | End: 2025-09-04

## 2025-09-01 RX ORDER — METHYLPREDNISOLONE ACETATE 80 MG/ML
125 INJECTION, SUSPENSION INTRA-ARTICULAR; INTRALESIONAL; INTRAMUSCULAR; SOFT TISSUE ONCE
Refills: 0 | Status: COMPLETED | OUTPATIENT
Start: 2025-09-01 | End: 2025-09-01

## 2025-09-01 RX ORDER — DIPHENHYDRAMINE HCL 12.5MG/5ML
25 ELIXIR ORAL ONCE
Refills: 0 | Status: COMPLETED | OUTPATIENT
Start: 2025-09-01 | End: 2025-09-01

## 2025-09-01 RX ADMIN — METHYLPREDNISOLONE ACETATE 125 MILLIGRAM(S): 80 INJECTION, SUSPENSION INTRA-ARTICULAR; INTRALESIONAL; INTRAMUSCULAR; SOFT TISSUE at 19:55

## 2025-09-01 RX ADMIN — Medication 25 MILLIGRAM(S): at 19:55
